# Patient Record
Sex: FEMALE | Race: WHITE | NOT HISPANIC OR LATINO | URBAN - NONMETROPOLITAN AREA
[De-identification: names, ages, dates, MRNs, and addresses within clinical notes are randomized per-mention and may not be internally consistent; named-entity substitution may affect disease eponyms.]

---

## 2017-04-26 ENCOUNTER — OFFICE VISIT (OUTPATIENT)
Dept: FAMILY MEDICINE CLINIC | Facility: CLINIC | Age: 47
End: 2017-04-26

## 2017-04-26 VITALS
SYSTOLIC BLOOD PRESSURE: 168 MMHG | DIASTOLIC BLOOD PRESSURE: 98 MMHG | WEIGHT: 211.8 LBS | BODY MASS INDEX: 38.98 KG/M2 | HEIGHT: 62 IN

## 2017-04-26 DIAGNOSIS — G47.00 INSOMNIA, UNSPECIFIED TYPE: ICD-10-CM

## 2017-04-26 DIAGNOSIS — F41.1 GENERALIZED ANXIETY DISORDER: Primary | ICD-10-CM

## 2017-04-26 PROCEDURE — 99214 OFFICE O/P EST MOD 30 MIN: CPT | Performed by: FAMILY MEDICINE

## 2017-04-26 RX ORDER — ZOLPIDEM TARTRATE 10 MG/1
10 TABLET ORAL NIGHTLY PRN
Qty: 30 TABLET | Refills: 2 | Status: SHIPPED | OUTPATIENT
Start: 2017-04-26 | End: 2017-10-04 | Stop reason: SDUPTHER

## 2017-04-26 RX ORDER — VENLAFAXINE 37.5 MG/1
37.5 TABLET ORAL 2 TIMES DAILY
COMMUNITY
End: 2021-02-03

## 2017-04-26 RX ORDER — ALPRAZOLAM 0.5 MG/1
0.5 TABLET ORAL NIGHTLY PRN
Qty: 30 TABLET | Refills: 0 | Status: SHIPPED | OUTPATIENT
Start: 2017-04-26 | End: 2020-08-05

## 2017-04-26 NOTE — PROGRESS NOTES
Subjective   Lauryn Barlow is a 47 y.o. female.  He is here today for follow-up on anxiety and depression issues and is particularly having difficulty sleeping.  She lost her son in an accident several years ago.  She has understandably had a great difficulty dealing with this and still has episodes of more severe anxiety and difficulty sleeping.  The sleep issues trouble her most every night.  Sometimes she has times where she can deal with this better than others but feels depressed always.  She was suicidal initially but right now says she has no suicidal feelings and she's been on Effexor which she feels helps with the depression symptoms and would like to stay on it.  She's just not sleeping well.  She would like to have Xanax to use when she has the panic attacks.  She describes episodes that are almost like flashbacks from PTSD when something happens that triggers her to think about it.    History of Present Illness   Insomnia   This is a chronic problem. The current episode started more than 1 year ago. The problem occurs daily. The problem has been unchanged. Associated symptoms include fatigue. Pertinent negatives include no abdominal pain, anorexia, arthralgias, change in bowel habit, chest pain, chills, congestion, coughing, diaphoresis, fever, headaches, joint swelling, myalgias, nausea, neck pain, numbness, rash, sore throat, swollen glands, urinary symptoms, vertigo, visual change, vomiting or weakness. The symptoms are aggravated by exertion and stress. Treatments tried: OTC sleep aids. The treatment provided no relief.     The following portions of the patient's history were reviewed and updated as appropriate: allergies, current medications, past family history, past medical history, past social history, past surgical history and problem list.    Review of Systems   Constitutional: Negative.    HENT: Negative.    Respiratory: Negative.  Negative for shortness of breath.    Cardiovascular:  Negative.  Negative for chest pain.   Gastrointestinal: Negative.    Musculoskeletal: Negative.  Negative for myalgias.   Skin: Negative.    Allergic/Immunologic: Negative for immunocompromised state.   Neurological: Negative for dizziness, tremors, seizures, syncope, weakness and numbness.   Hematological: Negative.    Psychiatric/Behavioral: Positive for dysphoric mood and sleep disturbance. Negative for agitation and confusion. The patient is nervous/anxious.        Objective   Physical Exam   Constitutional: She is oriented to person, place, and time. She appears well-developed and well-nourished.   HENT:   Head: Normocephalic and atraumatic.   Nose: Nose normal.   Mouth/Throat: Oropharynx is clear and moist.   Eyes: Conjunctivae and EOM are normal. Pupils are equal, round, and reactive to light.   Neck: Normal range of motion. Neck supple. No JVD present. No tracheal deviation present. No thyromegaly present.   Cardiovascular: Normal rate, regular rhythm, normal heart sounds and intact distal pulses.    No murmur heard.  Pulmonary/Chest: Effort normal and breath sounds normal. She has no wheezes.   Abdominal: Soft. Bowel sounds are normal. She exhibits no distension. There is no tenderness.   Musculoskeletal: Normal range of motion. She exhibits no edema.   Lymphadenopathy:     She has no cervical adenopathy.   Neurological: She is alert and oriented to person, place, and time. Coordination normal.   Skin: Skin is warm and dry. No rash noted.   Psychiatric: She has a normal mood and affect.   Nursing note and vitals reviewed.      Assessment/Plan   Lauryn was seen today for annual exam.    Diagnoses and all orders for this visit:    Generalized anxiety disorder    Insomnia, unspecified type    Other orders  -     zolpidem (AMBIEN) 10 MG tablet; Take 1 tablet by mouth At Night As Needed for Sleep.  -     ALPRAZolam (XANAX) 0.5 MG tablet; Take 1 tablet by mouth At Night As Needed for Anxiety.  The patient has read  and signed the [facility] Controlled Substance Contract.  I will continue to see patient for regular follow up appointments. Patient is well controlled on the medication.  DEVANTE has been reviewed by me and is updated every 3 months. The patient is aware of the potential for addiction and dependence.   Continue Effexor, no refill needed today  Add Ambien to help her sleep as she did well with this this before  Use Xanax only prn for panic attacks

## 2017-07-26 ENCOUNTER — OFFICE VISIT (OUTPATIENT)
Dept: FAMILY MEDICINE CLINIC | Facility: CLINIC | Age: 47
End: 2017-07-26

## 2017-07-26 VITALS
DIASTOLIC BLOOD PRESSURE: 86 MMHG | SYSTOLIC BLOOD PRESSURE: 138 MMHG | BODY MASS INDEX: 35.26 KG/M2 | HEIGHT: 62 IN | WEIGHT: 191.6 LBS

## 2017-07-26 DIAGNOSIS — F32.A DEPRESSIVE DISORDER: ICD-10-CM

## 2017-07-26 DIAGNOSIS — F41.1 GENERALIZED ANXIETY DISORDER: Primary | ICD-10-CM

## 2017-07-26 PROCEDURE — 99213 OFFICE O/P EST LOW 20 MIN: CPT | Performed by: FAMILY MEDICINE

## 2017-07-26 NOTE — PROGRESS NOTES
Subjective   Lauryn Barlow is a 47 y.o. female who presents to the office for follow-up on anxiety and weight issues.  She is using a new product which is over-the-counter and evidently induces some types of ketotic state.  She has tapered herself off the Xanax and Ambien is working off the Effexor.  She doesn't feel she needs it.  She is not having anxiety issues and she has lost 20 pounds in 2 months.  She feels better than she has in about 8 or 9 years.    History of Present Illness   Anxiety   Presents for follow-up visit. Symptoms include decreased concentration, depressed mood, excessive worry, irritability, muscle tension, nervous/anxious behavior, palpitations, panic and restlessness. Patient reports no chest pain, compulsions, confusion, dizziness, dry mouth, feeling of choking, hyperventilation, impotence, insomnia, malaise, nausea, obsessions, shortness of breath or suicidal ideas. Symptoms occur very rarely now . The quality of sleep is good.     Compliance with medications is %.     The following portions of the patient's history were reviewed and updated as appropriate: allergies, current medications, past family history, past medical history, past social history, past surgical history and problem list.    Review of Systems   Constitutional: Negative.    HENT: Negative.    Respiratory: Negative.  Negative for shortness of breath.    Cardiovascular: Negative.  Negative for chest pain.   Gastrointestinal: Negative.    Musculoskeletal: Negative.  Negative for myalgias.   Skin: Negative.    Allergic/Immunologic: Negative for immunocompromised state.   Neurological: Negative for dizziness, tremors, seizures, syncope, weakness and numbness.   Hematological: Negative.    Psychiatric/Behavioral: Negative for agitation, confusion, dysphoric mood and sleep disturbance. The patient is not nervous/anxious.        Objective   Physical Exam   Constitutional: She is oriented to person, place, and time. She  appears well-developed and well-nourished.   HENT:   Head: Normocephalic and atraumatic.   Nose: Nose normal.   Mouth/Throat: Oropharynx is clear and moist.   Eyes: Conjunctivae and EOM are normal. Pupils are equal, round, and reactive to light.   Neck: Normal range of motion. Neck supple. No JVD present. No tracheal deviation present. No thyromegaly present.   Cardiovascular: Normal rate, regular rhythm, normal heart sounds and intact distal pulses.    No murmur heard.  Pulmonary/Chest: Effort normal and breath sounds normal. She has no wheezes.   Abdominal: Soft. Bowel sounds are normal. She exhibits no distension. There is no tenderness.   Musculoskeletal: Normal range of motion. She exhibits no edema.   Lymphadenopathy:     She has no cervical adenopathy.   Neurological: She is alert and oriented to person, place, and time. Coordination normal.   Skin: Skin is warm and dry. No rash noted.   Psychiatric: She has a normal mood and affect.   Nursing note and vitals reviewed.      Assessment/Plan   Lauryn was seen today for follow-up.    Diagnoses and all orders for this visit:    Generalized anxiety disorder    Depressive disorder   she's going to continue with her current diet plan and does not need refill of medications now.  For recurrent symptoms she will contact me and she is going to continue to taper off the Effexor.

## 2017-10-03 RX ORDER — ZOLPIDEM TARTRATE 10 MG/1
TABLET ORAL
Qty: 30 TABLET | Refills: 2 | OUTPATIENT
Start: 2017-10-03

## 2017-10-04 RX ORDER — ZOLPIDEM TARTRATE 10 MG/1
TABLET ORAL
Qty: 30 TABLET | Refills: 2 | OUTPATIENT
Start: 2017-10-04

## 2017-10-04 RX ORDER — ZOLPIDEM TARTRATE 10 MG/1
10 TABLET ORAL NIGHTLY PRN
Qty: 30 TABLET | Refills: 2 | Status: SHIPPED | OUTPATIENT
Start: 2017-10-04 | End: 2020-08-05

## 2020-08-05 ENCOUNTER — TELEMEDICINE (OUTPATIENT)
Dept: FAMILY MEDICINE CLINIC | Facility: CLINIC | Age: 50
End: 2020-08-05

## 2020-08-05 VITALS
WEIGHT: 170 LBS | DIASTOLIC BLOOD PRESSURE: 79 MMHG | HEART RATE: 74 BPM | BODY MASS INDEX: 32.1 KG/M2 | SYSTOLIC BLOOD PRESSURE: 124 MMHG | HEIGHT: 61 IN

## 2020-08-05 DIAGNOSIS — G51.0 BELL'S PALSY: Primary | ICD-10-CM

## 2020-08-05 DIAGNOSIS — I10 ESSENTIAL HYPERTENSION: ICD-10-CM

## 2020-08-05 DIAGNOSIS — D75.1 POLYCYTHEMIA: ICD-10-CM

## 2020-08-05 PROCEDURE — 99214 OFFICE O/P EST MOD 30 MIN: CPT | Performed by: FAMILY MEDICINE

## 2020-08-05 RX ORDER — VIT C/B6/B5/MAGNESIUM/HERB 173 50-5-6-5MG
1 CAPSULE ORAL DAILY
COMMUNITY

## 2020-08-05 RX ORDER — LANOLIN ALCOHOL/MO/W.PET/CERES
1000 CREAM (GRAM) TOPICAL DAILY
COMMUNITY

## 2020-08-05 RX ORDER — OMEGA-3S/DHA/EPA/FISH OIL/D3 300MG-1000
400 CAPSULE ORAL DAILY
COMMUNITY

## 2020-08-05 RX ORDER — LISINOPRIL 20 MG/1
20 TABLET ORAL DAILY
Qty: 30 TABLET | Refills: 5 | Status: SHIPPED | OUTPATIENT
Start: 2020-08-05 | End: 2021-05-05

## 2020-08-05 RX ORDER — LISINOPRIL 20 MG/1
20 TABLET ORAL DAILY
COMMUNITY
Start: 2020-07-17 | End: 2020-08-05 | Stop reason: SDUPTHER

## 2020-08-05 NOTE — PROGRESS NOTES
Subjective   Lauryn Barlow is a 50 y.o. female.   Seen today by video visit due to the Covid-19 quarantine.   Patient developed Bell's palsy a couple weeks ago.  Went to ER initially to confirm that it was Bell's palsy.  She did have work-up there in ER including labs, EKG.  She also had a CT of the head and a CTA of the head and neck as she had markedly elevated blood pressure.  At the hospital diastolic was as high as 110.  She was started on lisinopril there and has monitored her blood pressure regularly since she has been home.  It has been doing significantly better.  /79 today, yesterday 118/78  The Bell's palsy has continued to improve but is not completely resolved    History of Present Illness    The following portions of the patient's history were reviewed and updated as appropriate: allergies, current medications, past family history, past medical history, past social history, past surgical history and problem list.    Review of Systems   Constitutional: Negative.    HENT: Negative.    Respiratory: Negative.  Negative for shortness of breath.    Cardiovascular: Negative.  Negative for chest pain.   Gastrointestinal: Negative.    Musculoskeletal: Negative.  Negative for myalgias.   Skin: Negative.    Allergic/Immunologic: Negative for immunocompromised state.   Neurological: Negative for dizziness, tremors, seizures, syncope, weakness and numbness.   Hematological: Negative.    Psychiatric/Behavioral: Negative for agitation, confusion, dysphoric mood and sleep disturbance. The patient is not nervous/anxious.    All other systems reviewed and are negative.      Objective   Physical Exam   Constitutional: She is oriented to person, place, and time. She appears well-developed and well-nourished. No distress.   HENT:   Head: Normocephalic and atraumatic.   Eyes: Pupils are equal, round, and reactive to light. EOM are normal. Right eye exhibits no discharge. Left eye exhibits no discharge.      Pulmonary/Chest: Effort normal.   Neurological: She is alert and oriented to person, place, and time.   She has an appearance consistent with Bell's palsy on the left side of her face with a mild facial droop   Psychiatric: She has a normal mood and affect. Her behavior is normal. Judgment and thought content normal.       Assessment/Plan   Lauryn was seen today for hypertension.    Diagnoses and all orders for this visit:    Bell's palsy    Essential hypertension  -     lisinopril (PRINIVIL,ZESTRIL) 20 MG tablet; Take 1 tablet by mouth Daily.    Polycythemia    Continue lisinopril for hypertension and will get labs in 3 months    Discussed polycythemia findings on CBC done in ER.  She is a smoker which may contribute to this.  She may need work-up for sleep apnea if symptoms persist or referral for therapeutic phlebotomy but will check a CBC again in 3 months    If Bell's palsy symptoms worsen or persist contact me but otherwise expect they will continue to resolve        This document has been electronically signed by Pratima Ling MD on August 5, 2020 15:24

## 2021-02-03 ENCOUNTER — OFFICE VISIT (OUTPATIENT)
Dept: FAMILY MEDICINE CLINIC | Facility: CLINIC | Age: 51
End: 2021-02-03

## 2021-02-03 DIAGNOSIS — G47.00 INSOMNIA, UNSPECIFIED TYPE: ICD-10-CM

## 2021-02-03 DIAGNOSIS — I10 ESSENTIAL HYPERTENSION: Primary | ICD-10-CM

## 2021-02-03 PROCEDURE — 99443 PR PHYS/QHP TELEPHONE EVALUATION 21-30 MIN: CPT | Performed by: FAMILY MEDICINE

## 2021-02-03 RX ORDER — ZOLPIDEM TARTRATE 10 MG/1
10 TABLET ORAL NIGHTLY PRN
Qty: 30 TABLET | Refills: 2 | Status: SHIPPED | OUTPATIENT
Start: 2021-02-03 | End: 2021-06-09 | Stop reason: SDUPTHER

## 2021-02-03 RX ORDER — MULTIVIT WITH MINERALS/LUTEIN
250 TABLET ORAL DAILY
COMMUNITY

## 2021-02-03 NOTE — PROGRESS NOTES
Subjective   Lauryn Barlow is a 50 y.o. female.   This visit has been rescheduled as a phone visit to comply with patient safety concerns in accordance with CDC recommendations. Total time of discussion was 21 minutes.    You have chosen to receive care through a telephone visit. Do you consent to use a telephone visit for your medical care today? Yes    She had a Bell's palsy in the past year and feels like her speech is a bit different since then.     She had a mild polycythemia on labs in July, needs to recheck and is due for labs for hypertension.  Her BP runs 120-130/80-85.     She does not feel that she needs Ambien every night but would like to have a prescription to take when needed.    History of Present Illness    The following portions of the patient's history were reviewed and updated as appropriate: allergies, current medications, past family history, past medical history, past social history, past surgical history and problem list.    Review of Systems   Constitutional: Negative.    HENT: Negative.    Respiratory: Negative.  Negative for shortness of breath.    Cardiovascular: Negative.  Negative for chest pain.   Gastrointestinal: Negative.    Musculoskeletal: Negative.  Negative for myalgias.   Skin: Negative.    Allergic/Immunologic: Negative for immunocompromised state.   Neurological: Negative for dizziness, tremors, seizures, syncope, weakness and numbness.   Hematological: Negative.    Psychiatric/Behavioral: Negative for agitation, confusion, dysphoric mood and sleep disturbance. The patient is not nervous/anxious.    All other systems reviewed and are negative.    Objective     Assessment/Plan   Diagnoses and all orders for this visit:    1. Essential hypertension (Primary)  -     Comprehensive Metabolic Panel  -     CBC & Differential; Future  -     Lipid Panel; Future  -     T4, Free  -     TSH    2. Insomnia, unspecified type  -     zolpidem (AMBIEN) 10 MG tablet; Take 1 tablet by mouth  At Night As Needed for Sleep.  Dispense: 30 tablet; Refill: 2    Continue lisinopril for hypertension    Return for labs as above including CBC more for monitoring of polycythemia    Continue Ambien for insomnia.  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    As far as Bell's palsy symptoms, if she has any worsening or development of new symptoms contact me.  If speech changes persist may need to consider neurologic imaging.          This document has been electronically signed by Pratima Ling MD on February 3, 2021 11:11 CST

## 2021-03-03 ENCOUNTER — LAB (OUTPATIENT)
Dept: LAB | Facility: OTHER | Age: 51
End: 2021-03-03

## 2021-03-03 DIAGNOSIS — I10 ESSENTIAL HYPERTENSION: ICD-10-CM

## 2021-03-03 LAB
ALBUMIN SERPL-MCNC: 4.5 G/DL (ref 3.5–5)
ALBUMIN/GLOB SERPL: 1.5 G/DL (ref 1.1–1.8)
ALP SERPL-CCNC: 73 U/L (ref 38–126)
ALT SERPL W P-5'-P-CCNC: 22 U/L
ANION GAP SERPL CALCULATED.3IONS-SCNC: 8 MMOL/L (ref 5–15)
AST SERPL-CCNC: 23 U/L (ref 14–36)
BASOPHILS # BLD AUTO: 0.05 10*3/MM3 (ref 0–0.2)
BASOPHILS NFR BLD AUTO: 0.6 % (ref 0–1.5)
BILIRUB SERPL-MCNC: 0.7 MG/DL (ref 0.2–1.3)
BUN SERPL-MCNC: 12 MG/DL (ref 7–23)
BUN/CREAT SERPL: 28.6 (ref 7–25)
CALCIUM SPEC-SCNC: 10.3 MG/DL (ref 8.4–10.2)
CHLORIDE SERPL-SCNC: 109 MMOL/L (ref 101–112)
CHOLEST SERPL-MCNC: 227 MG/DL (ref 150–200)
CO2 SERPL-SCNC: 24 MMOL/L (ref 22–30)
CREAT SERPL-MCNC: 0.42 MG/DL (ref 0.52–1.04)
DEPRECATED RDW RBC AUTO: 42.3 FL (ref 37–54)
EOSINOPHIL # BLD AUTO: 0.14 10*3/MM3 (ref 0–0.4)
EOSINOPHIL NFR BLD AUTO: 1.8 % (ref 0.3–6.2)
ERYTHROCYTE [DISTWIDTH] IN BLOOD BY AUTOMATED COUNT: 13.7 % (ref 12.3–15.4)
GFR SERPL CREATININE-BSD FRML MDRD: 159 ML/MIN/1.73 (ref 51–120)
GLOBULIN UR ELPH-MCNC: 3.1 GM/DL (ref 2.3–3.5)
GLUCOSE SERPL-MCNC: 119 MG/DL (ref 70–99)
HCT VFR BLD AUTO: 47.8 % (ref 34–46.6)
HDLC SERPL-MCNC: 39 MG/DL (ref 40–59)
HGB BLD-MCNC: 16.5 G/DL (ref 12–15.9)
LDLC SERPL CALC-MCNC: 156 MG/DL
LDLC/HDLC SERPL: 3.92 {RATIO} (ref 0–3.22)
LYMPHOCYTES # BLD AUTO: 2.63 10*3/MM3 (ref 0.7–3.1)
LYMPHOCYTES NFR BLD AUTO: 33 % (ref 19.6–45.3)
MCH RBC QN AUTO: 29.5 PG (ref 26.6–33)
MCHC RBC AUTO-ENTMCNC: 34.5 G/DL (ref 31.5–35.7)
MCV RBC AUTO: 85.4 FL (ref 79–97)
MONOCYTES # BLD AUTO: 0.57 10*3/MM3 (ref 0.1–0.9)
MONOCYTES NFR BLD AUTO: 7.2 % (ref 5–12)
NEUTROPHILS NFR BLD AUTO: 4.57 10*3/MM3 (ref 1.7–7)
NEUTROPHILS NFR BLD AUTO: 57.4 % (ref 42.7–76)
PLATELET # BLD AUTO: 144 10*3/MM3 (ref 140–450)
PMV BLD AUTO: 11.9 FL (ref 6–12)
POTASSIUM SERPL-SCNC: 4 MMOL/L (ref 3.4–5)
PROT SERPL-MCNC: 7.6 G/DL (ref 6.3–8.6)
RBC # BLD AUTO: 5.6 10*6/MM3 (ref 3.77–5.28)
SODIUM SERPL-SCNC: 141 MMOL/L (ref 137–145)
T4 FREE SERPL-MCNC: 1.47 NG/DL (ref 0.93–1.7)
TRIGL SERPL-MCNC: 175 MG/DL
TSH SERPL DL<=0.05 MIU/L-ACNC: 1.41 UIU/ML (ref 0.27–4.2)
VLDLC SERPL-MCNC: 32 MG/DL (ref 5–40)
WBC # BLD AUTO: 7.96 10*3/MM3 (ref 3.4–10.8)

## 2021-03-03 PROCEDURE — 84443 ASSAY THYROID STIM HORMONE: CPT | Performed by: FAMILY MEDICINE

## 2021-03-03 PROCEDURE — 84439 ASSAY OF FREE THYROXINE: CPT | Performed by: FAMILY MEDICINE

## 2021-03-03 PROCEDURE — 85025 COMPLETE CBC W/AUTO DIFF WBC: CPT | Performed by: FAMILY MEDICINE

## 2021-03-03 PROCEDURE — 36415 COLL VENOUS BLD VENIPUNCTURE: CPT | Performed by: FAMILY MEDICINE

## 2021-03-03 PROCEDURE — 80053 COMPREHEN METABOLIC PANEL: CPT | Performed by: FAMILY MEDICINE

## 2021-03-03 PROCEDURE — 80061 LIPID PANEL: CPT | Performed by: FAMILY MEDICINE

## 2021-03-04 RX ORDER — ROSUVASTATIN CALCIUM 5 MG/1
5 TABLET, COATED ORAL DAILY
Qty: 30 TABLET | Refills: 5 | Status: SHIPPED | OUTPATIENT
Start: 2021-03-04 | End: 2021-06-09 | Stop reason: SDUPTHER

## 2021-03-08 DIAGNOSIS — D75.1 POLYCYTHEMIA: Primary | ICD-10-CM

## 2021-03-08 DIAGNOSIS — E78.00 ELEVATED CHOLESTEROL: ICD-10-CM

## 2021-05-05 DIAGNOSIS — I10 ESSENTIAL HYPERTENSION: ICD-10-CM

## 2021-05-05 RX ORDER — LISINOPRIL 20 MG/1
20 TABLET ORAL DAILY
Qty: 30 TABLET | Refills: 5 | Status: SHIPPED | OUTPATIENT
Start: 2021-05-05 | End: 2021-06-09 | Stop reason: SDUPTHER

## 2021-06-01 ENCOUNTER — LAB (OUTPATIENT)
Dept: LAB | Facility: OTHER | Age: 51
End: 2021-06-01

## 2021-06-01 DIAGNOSIS — D75.1 POLYCYTHEMIA: ICD-10-CM

## 2021-06-01 DIAGNOSIS — E78.00 ELEVATED CHOLESTEROL: ICD-10-CM

## 2021-06-01 LAB
ALBUMIN SERPL-MCNC: 4.4 G/DL (ref 3.5–5)
ALBUMIN/GLOB SERPL: 1.5 G/DL (ref 1.1–1.8)
ALP SERPL-CCNC: 65 U/L (ref 38–126)
ALT SERPL W P-5'-P-CCNC: 24 U/L
ANION GAP SERPL CALCULATED.3IONS-SCNC: 8 MMOL/L (ref 5–15)
ARTICHOKE IGE QN: 71 MG/DL (ref 0–100)
AST SERPL-CCNC: 35 U/L (ref 14–36)
BILIRUB SERPL-MCNC: 0.6 MG/DL (ref 0.2–1.3)
BUN SERPL-MCNC: 13 MG/DL (ref 7–23)
BUN/CREAT SERPL: 31 (ref 7–25)
CALCIUM SPEC-SCNC: 10.4 MG/DL (ref 8.4–10.2)
CHLORIDE SERPL-SCNC: 105 MMOL/L (ref 101–112)
CO2 SERPL-SCNC: 29 MMOL/L (ref 22–30)
CREAT SERPL-MCNC: 0.42 MG/DL (ref 0.52–1.04)
GFR SERPL CREATININE-BSD FRML MDRD: 158 ML/MIN/1.73 (ref 51–120)
GLOBULIN UR ELPH-MCNC: 3 GM/DL (ref 2.3–3.5)
GLUCOSE SERPL-MCNC: 102 MG/DL (ref 70–99)
POTASSIUM SERPL-SCNC: 4.1 MMOL/L (ref 3.4–5)
PROT SERPL-MCNC: 7.4 G/DL (ref 6.3–8.6)
SODIUM SERPL-SCNC: 142 MMOL/L (ref 137–145)

## 2021-06-01 PROCEDURE — 80053 COMPREHEN METABOLIC PANEL: CPT | Performed by: FAMILY MEDICINE

## 2021-06-01 PROCEDURE — 36415 COLL VENOUS BLD VENIPUNCTURE: CPT | Performed by: FAMILY MEDICINE

## 2021-06-01 PROCEDURE — 83721 ASSAY OF BLOOD LIPOPROTEIN: CPT | Performed by: FAMILY MEDICINE

## 2021-06-09 ENCOUNTER — OFFICE VISIT (OUTPATIENT)
Dept: FAMILY MEDICINE CLINIC | Facility: CLINIC | Age: 51
End: 2021-06-09

## 2021-06-09 VITALS
SYSTOLIC BLOOD PRESSURE: 138 MMHG | HEIGHT: 61 IN | DIASTOLIC BLOOD PRESSURE: 82 MMHG | BODY MASS INDEX: 32.1 KG/M2 | WEIGHT: 170 LBS

## 2021-06-09 DIAGNOSIS — G47.00 INSOMNIA, UNSPECIFIED TYPE: ICD-10-CM

## 2021-06-09 DIAGNOSIS — I10 ESSENTIAL HYPERTENSION: ICD-10-CM

## 2021-06-09 DIAGNOSIS — D75.1 POLYCYTHEMIA: Primary | ICD-10-CM

## 2021-06-09 DIAGNOSIS — E78.2 MIXED HYPERLIPIDEMIA: ICD-10-CM

## 2021-06-09 DIAGNOSIS — G51.0 BELL'S PALSY: ICD-10-CM

## 2021-06-09 PROCEDURE — 99214 OFFICE O/P EST MOD 30 MIN: CPT | Performed by: FAMILY MEDICINE

## 2021-06-09 RX ORDER — ZOLPIDEM TARTRATE 10 MG/1
10 TABLET ORAL NIGHTLY PRN
Qty: 30 TABLET | Refills: 2 | Status: SHIPPED | OUTPATIENT
Start: 2021-06-09 | End: 2021-08-11

## 2021-06-09 RX ORDER — ROSUVASTATIN CALCIUM 5 MG/1
5 TABLET, COATED ORAL DAILY
Qty: 30 TABLET | Refills: 5 | Status: SHIPPED | OUTPATIENT
Start: 2021-06-09 | End: 2021-08-11

## 2021-06-09 RX ORDER — LISINOPRIL 20 MG/1
20 TABLET ORAL DAILY
Qty: 30 TABLET | Refills: 5 | Status: SHIPPED | OUTPATIENT
Start: 2021-06-09 | End: 2021-11-17

## 2021-06-09 NOTE — PROGRESS NOTES
"Subjective   Lauryn Kandy Barlow is a 51 y.o. female.   Patient with chronic insomnia, hypertension.  Here today for follow-up and refills.  Had some recent labs.  She does have polycythemia.  She says that she is a heavy smoker and has no desire to stop at this time.  Explained that this is most likely related.    Needs refill of current Crestor for lipids and lisinopril for hypertension.  Needs to refill Ambien for insomnia.    Had recent Bell's palsy.  Symptoms have improved but she is still having some mild deformity on the left face.    History of Present Illness    The following portions of the patient's history were reviewed and updated as appropriate: allergies, current medications, past family history, past medical history, past social history, past surgical history and problem list.    Review of Systems   Constitutional: Negative.    HENT: Negative.    Respiratory: Negative.  Negative for shortness of breath.    Cardiovascular: Negative.  Negative for chest pain.   Gastrointestinal: Negative.    Musculoskeletal: Negative.  Negative for myalgias.   Skin: Negative.    Allergic/Immunologic: Negative for immunocompromised state.   Neurological: Negative for dizziness, tremors, seizures, syncope, weakness and numbness.   Hematological: Negative.    Psychiatric/Behavioral: Negative for agitation, confusion, dysphoric mood and sleep disturbance. The patient is not nervous/anxious.    All other systems reviewed and are negative.      Objective    Body mass index is 32.12 kg/m².  Vitals:    06/09/21 0951   BP: 138/82   Weight: 77.1 kg (170 lb)   Height: 154.9 cm (61\")       Physical Exam   Constitutional: She is oriented to person, place, and time. She appears well-developed. No distress.   HENT:   Head: Normocephalic and atraumatic.   Eyes: Pupils are equal, round, and reactive to light. Right eye exhibits no discharge. Left eye exhibits no discharge.   Pulmonary/Chest: Effort normal.   Neurological: She is alert and " oriented to person, place, and time.   She has an appearance consistent with Bell's palsy on the left side of her face with a mild facial droop   Psychiatric: Her behavior is normal. Judgment and thought content normal.     Lab on 06/01/2021   Component Date Value Ref Range Status   • Glucose 06/01/2021 102* 70 - 99 mg/dL Final   • BUN 06/01/2021 13  7 - 23 mg/dL Final   • Creatinine 06/01/2021 0.42* 0.52 - 1.04 mg/dL Final   • Sodium 06/01/2021 142  137 - 145 mmol/L Final   • Potassium 06/01/2021 4.1  3.4 - 5.0 mmol/L Final   • Chloride 06/01/2021 105  101 - 112 mmol/L Final   • CO2 06/01/2021 29.0  22.0 - 30.0 mmol/L Final   • Calcium 06/01/2021 10.4* 8.4 - 10.2 mg/dL Final   • Total Protein 06/01/2021 7.4  6.3 - 8.6 g/dL Final   • Albumin 06/01/2021 4.40  3.50 - 5.00 g/dL Final   • ALT (SGPT) 06/01/2021 24  <=35 U/L Final   • AST (SGOT) 06/01/2021 35  14 - 36 U/L Final   • Alkaline Phosphatase 06/01/2021 65  38 - 126 U/L Final   • Total Bilirubin 06/01/2021 0.6  0.2 - 1.3 mg/dL Final   • eGFR Non African Amer 06/01/2021 158* 51 - 120 mL/min/1.73 Final   • Globulin 06/01/2021 3.0  2.3 - 3.5 gm/dL Final   • A/G Ratio 06/01/2021 1.5  1.1 - 1.8 g/dL Final   • BUN/Creatinine Ratio 06/01/2021 31.0* 7.0 - 25.0 Final   • Anion Gap 06/01/2021 8.0  5.0 - 15.0 mmol/L Final   • LDL Cholesterol  06/01/2021 71  0 - 100 mg/dL Final   ]    Assessment/Plan   Diagnoses and all orders for this visit:    1. Polycythemia (Primary)  -     CBC & Differential; Future    2. Essential hypertension  -     lisinopril (PRINIVIL,ZESTRIL) 20 MG tablet; Take 1 tablet by mouth Daily.  Dispense: 30 tablet; Refill: 5    3. Insomnia, unspecified type  -     zolpidem (AMBIEN) 10 MG tablet; Take 1 tablet by mouth At Night As Needed for Sleep.  Dispense: 30 tablet; Refill: 2    4. Bell's palsy    5. Mixed hyperlipidemia  -     rosuvastatin (Crestor) 5 MG tablet; Take 1 tablet by mouth Daily.  Dispense: 30 tablet; Refill: 5    Continue lisinopril for  hypertension     Continue Crestor for lipids    Continues to have polycythemia.  Discussed the role that smoking plays in this and again states she has no desire to quit.  We will continue to monitor with a CBC in 6 months.    Bell's palsy symptoms are mildly persistent.  We will continue to follow.  Discussed the fact that she may not have complete resolution of symptoms.        This document has been electronically signed by Pratima Ling MD on June 9, 2021 18:07 CDT

## 2021-08-11 DIAGNOSIS — E78.2 MIXED HYPERLIPIDEMIA: ICD-10-CM

## 2021-08-11 DIAGNOSIS — G47.00 INSOMNIA, UNSPECIFIED TYPE: ICD-10-CM

## 2021-08-11 RX ORDER — ROSUVASTATIN CALCIUM 5 MG/1
5 TABLET, COATED ORAL DAILY
Qty: 30 TABLET | Refills: 5 | Status: SHIPPED | OUTPATIENT
Start: 2021-08-11 | End: 2022-05-31

## 2021-08-11 RX ORDER — ZOLPIDEM TARTRATE 10 MG/1
TABLET ORAL
Qty: 30 TABLET | Refills: 2 | Status: SHIPPED | OUTPATIENT
Start: 2021-08-11 | End: 2022-01-14

## 2021-11-17 DIAGNOSIS — I10 ESSENTIAL HYPERTENSION: ICD-10-CM

## 2021-11-17 RX ORDER — LISINOPRIL 20 MG/1
20 TABLET ORAL DAILY
Qty: 30 TABLET | Refills: 5 | Status: SHIPPED | OUTPATIENT
Start: 2021-11-17 | End: 2022-05-31

## 2021-11-22 ENCOUNTER — LAB (OUTPATIENT)
Dept: LAB | Facility: OTHER | Age: 51
End: 2021-11-22

## 2021-11-22 DIAGNOSIS — E78.5 HYPERLIPIDEMIA, UNSPECIFIED HYPERLIPIDEMIA TYPE: Primary | ICD-10-CM

## 2021-11-22 DIAGNOSIS — D75.1 POLYCYTHEMIA: ICD-10-CM

## 2021-11-22 DIAGNOSIS — E78.5 HYPERLIPIDEMIA, UNSPECIFIED HYPERLIPIDEMIA TYPE: ICD-10-CM

## 2021-11-22 LAB
ALBUMIN SERPL-MCNC: 4.6 G/DL (ref 3.5–5)
ALBUMIN/GLOB SERPL: 1.6 G/DL (ref 1.1–1.8)
ALP SERPL-CCNC: 82 U/L (ref 38–126)
ALT SERPL W P-5'-P-CCNC: 24 U/L
ANION GAP SERPL CALCULATED.3IONS-SCNC: 7 MMOL/L (ref 5–15)
AST SERPL-CCNC: 24 U/L (ref 14–36)
BASOPHILS # BLD AUTO: 0.05 10*3/MM3 (ref 0–0.2)
BASOPHILS NFR BLD AUTO: 0.5 % (ref 0–1.5)
BILIRUB SERPL-MCNC: 0.6 MG/DL (ref 0.2–1.3)
BUN SERPL-MCNC: 19 MG/DL (ref 7–23)
BUN/CREAT SERPL: 48.7 (ref 7–25)
CALCIUM SPEC-SCNC: 10.7 MG/DL (ref 8.4–10.2)
CHLORIDE SERPL-SCNC: 105 MMOL/L (ref 101–112)
CHOLEST SERPL-MCNC: 194 MG/DL (ref 150–200)
CO2 SERPL-SCNC: 26 MMOL/L (ref 22–30)
CREAT SERPL-MCNC: 0.39 MG/DL (ref 0.52–1.04)
DEPRECATED RDW RBC AUTO: 40.8 FL (ref 37–54)
EOSINOPHIL # BLD AUTO: 0.15 10*3/MM3 (ref 0–0.4)
EOSINOPHIL NFR BLD AUTO: 1.5 % (ref 0.3–6.2)
ERYTHROCYTE [DISTWIDTH] IN BLOOD BY AUTOMATED COUNT: 13.3 % (ref 12.3–15.4)
GFR SERPL CREATININE-BSD FRML MDRD: 173 ML/MIN/1.73 (ref 51–120)
GLOBULIN UR ELPH-MCNC: 2.9 GM/DL (ref 2.3–3.5)
GLUCOSE SERPL-MCNC: 103 MG/DL (ref 70–99)
HCT VFR BLD AUTO: 46.9 % (ref 34–46.6)
HDLC SERPL-MCNC: 44 MG/DL (ref 40–59)
HGB BLD-MCNC: 15.7 G/DL (ref 12–15.9)
LDLC SERPL CALC-MCNC: 104 MG/DL
LDLC/HDLC SERPL: 2.17 {RATIO} (ref 0–3.22)
LYMPHOCYTES # BLD AUTO: 3.34 10*3/MM3 (ref 0.7–3.1)
LYMPHOCYTES NFR BLD AUTO: 33.8 % (ref 19.6–45.3)
MCH RBC QN AUTO: 28.9 PG (ref 26.6–33)
MCHC RBC AUTO-ENTMCNC: 33.5 G/DL (ref 31.5–35.7)
MCV RBC AUTO: 86.4 FL (ref 79–97)
MONOCYTES # BLD AUTO: 0.77 10*3/MM3 (ref 0.1–0.9)
MONOCYTES NFR BLD AUTO: 7.8 % (ref 5–12)
NEUTROPHILS NFR BLD AUTO: 5.57 10*3/MM3 (ref 1.7–7)
NEUTROPHILS NFR BLD AUTO: 56.4 % (ref 42.7–76)
PLATELET # BLD AUTO: 199 10*3/MM3 (ref 140–450)
PMV BLD AUTO: 11 FL (ref 6–12)
POTASSIUM SERPL-SCNC: 4.7 MMOL/L (ref 3.4–5)
PROT SERPL-MCNC: 7.5 G/DL (ref 6.3–8.6)
RBC # BLD AUTO: 5.43 10*6/MM3 (ref 3.77–5.28)
SODIUM SERPL-SCNC: 138 MMOL/L (ref 137–145)
T4 FREE SERPL-MCNC: 1.13 NG/DL (ref 0.93–1.7)
TRIGL SERPL-MCNC: 272 MG/DL
TSH SERPL DL<=0.05 MIU/L-ACNC: 1.72 UIU/ML (ref 0.27–4.2)
VLDLC SERPL-MCNC: 46 MG/DL (ref 5–40)
WBC NRBC COR # BLD: 9.88 10*3/MM3 (ref 3.4–10.8)

## 2021-11-22 PROCEDURE — 80061 LIPID PANEL: CPT | Performed by: FAMILY MEDICINE

## 2021-11-22 PROCEDURE — 84443 ASSAY THYROID STIM HORMONE: CPT | Performed by: FAMILY MEDICINE

## 2021-11-22 PROCEDURE — 85025 COMPLETE CBC W/AUTO DIFF WBC: CPT | Performed by: FAMILY MEDICINE

## 2021-11-22 PROCEDURE — 36415 COLL VENOUS BLD VENIPUNCTURE: CPT | Performed by: FAMILY MEDICINE

## 2021-11-22 PROCEDURE — 84439 ASSAY OF FREE THYROXINE: CPT | Performed by: FAMILY MEDICINE

## 2021-11-22 PROCEDURE — 80053 COMPREHEN METABOLIC PANEL: CPT | Performed by: FAMILY MEDICINE

## 2021-12-08 ENCOUNTER — OFFICE VISIT (OUTPATIENT)
Dept: FAMILY MEDICINE CLINIC | Facility: CLINIC | Age: 51
End: 2021-12-08

## 2021-12-08 VITALS
OXYGEN SATURATION: 97 % | BODY MASS INDEX: 34.93 KG/M2 | HEIGHT: 61 IN | SYSTOLIC BLOOD PRESSURE: 120 MMHG | WEIGHT: 185 LBS | HEART RATE: 68 BPM | DIASTOLIC BLOOD PRESSURE: 64 MMHG | TEMPERATURE: 97.7 F

## 2021-12-08 DIAGNOSIS — E78.5 HYPERLIPIDEMIA, UNSPECIFIED HYPERLIPIDEMIA TYPE: Primary | ICD-10-CM

## 2021-12-08 DIAGNOSIS — D75.1 POLYCYTHEMIA: ICD-10-CM

## 2021-12-08 DIAGNOSIS — G47.00 INSOMNIA, UNSPECIFIED TYPE: ICD-10-CM

## 2021-12-08 DIAGNOSIS — G51.0 BELL'S PALSY: ICD-10-CM

## 2021-12-08 DIAGNOSIS — I10 ESSENTIAL HYPERTENSION: ICD-10-CM

## 2021-12-08 PROCEDURE — 99214 OFFICE O/P EST MOD 30 MIN: CPT | Performed by: FAMILY MEDICINE

## 2021-12-08 NOTE — PROGRESS NOTES
Subjective   Lauryn Barlow is a 51 y.o. female.   Patient with chronic insomnia, hypertension, polycythemia, chronic Bell's palsy.  Has recent labs to review.  H&H are back in the normal range.  Cholesterol is improved although still a bit above goal.  Blood pressures have been well controlled.  She takes Ambien for chronic insomnia which works well for her.  Bell's palsy has improved but is not completely gone and she still has some mild weakness on the left face    History of Present Illness  Hypertension   This is a chronic problem. The current episode started more than 1 year ago. The problem has been waxing and waning since onset. Associated symptoms include anxiety, headaches and malaise/fatigue. Pertinent negatives include no blurred vision, chest pain, neck pain, orthopnea, palpitations, peripheral edema, PND, shortness of breath or sweats. There are no associated agents to hypertension. Risk factors for coronary artery disease include dyslipidemia and family history. Past treatments include ACE inhibitor's The current treatment provides moderate improvement. There are no compliance problems.    Insomnia   This is a chronic problem. The current episode started more than 1 year ago. The problem occurs daily. The problem has been unchanged. Associated symptoms include fatigue. Pertinent negatives include no abdominal pain, anorexia, arthralgias, change in bowel habit, chest pain, chills, congestion, coughing, diaphoresis, fever, headaches, joint swelling, myalgias, nausea, neck pain, numbness, rash, sore throat, swollen glands, urinary symptoms, vertigo, visual change, vomiting or weakness. The symptoms are aggravated by exertion and stress. Treatments tried: OTC sleep aids. The treatment provided no relief.     The following portions of the patient's history were reviewed and updated as appropriate: allergies, current medications, past family history, past medical history, past social history, past surgical  "history and problem list.    Review of Systems   Constitutional: Negative.    HENT: Negative.    Respiratory: Negative.  Negative for shortness of breath.    Cardiovascular: Negative.  Negative for chest pain.   Gastrointestinal: Negative.    Musculoskeletal: Negative.  Negative for myalgias.   Skin: Negative.    Allergic/Immunologic: Negative for immunocompromised state.   Neurological: Negative for dizziness, tremors, seizures, syncope, weakness and numbness.   Hematological: Negative.    Psychiatric/Behavioral: Negative for agitation, confusion, dysphoric mood and sleep disturbance. The patient is not nervous/anxious.    All other systems reviewed and are negative.      Objective    Body mass index is 34.96 kg/m².  Vitals:    12/08/21 0948   BP: 120/64   Pulse: 68   Temp: 97.7 °F (36.5 °C)   TempSrc: Tympanic   SpO2: 97%   Weight: 83.9 kg (185 lb)   Height: 154.9 cm (61\")       Physical Exam   Constitutional: She is oriented to person, place, and time. She appears well-developed. No distress.   HENT:   Head: Normocephalic and atraumatic.   Eyes: Pupils are equal, round, and reactive to light. Right eye exhibits no discharge. Left eye exhibits no discharge.   Pulmonary/Chest: Effort normal.   Neurological: She is alert and oriented to person, place, and time.   She has an appearance consistent with Bell's palsy on the left side of her face with a mild facial droop   Psychiatric: Her behavior is normal. Judgment and thought content normal.     Lab on 11/22/2021   Component Date Value Ref Range Status   • WBC 11/22/2021 9.88  3.40 - 10.80 10*3/mm3 Final   • RBC 11/22/2021 5.43* 3.77 - 5.28 10*6/mm3 Final   • Hemoglobin 11/22/2021 15.7  12.0 - 15.9 g/dL Final   • Hematocrit 11/22/2021 46.9* 34.0 - 46.6 % Final   • MCV 11/22/2021 86.4  79.0 - 97.0 fL Final   • MCH 11/22/2021 28.9  26.6 - 33.0 pg Final   • MCHC 11/22/2021 33.5  31.5 - 35.7 g/dL Final   • RDW 11/22/2021 13.3  12.3 - 15.4 % Final   • RDW-SD 11/22/2021 " 40.8  37.0 - 54.0 fl Final   • MPV 11/22/2021 11.0  6.0 - 12.0 fL Final   • Platelets 11/22/2021 199  140 - 450 10*3/mm3 Final   • Neutrophil % 11/22/2021 56.4  42.7 - 76.0 % Final   • Lymphocyte % 11/22/2021 33.8  19.6 - 45.3 % Final   • Monocyte % 11/22/2021 7.8  5.0 - 12.0 % Final   • Eosinophil % 11/22/2021 1.5  0.3 - 6.2 % Final   • Basophil % 11/22/2021 0.5  0.0 - 1.5 % Final   • Neutrophils, Absolute 11/22/2021 5.57  1.70 - 7.00 10*3/mm3 Final   • Lymphocytes, Absolute 11/22/2021 3.34* 0.70 - 3.10 10*3/mm3 Final   • Monocytes, Absolute 11/22/2021 0.77  0.10 - 0.90 10*3/mm3 Final   • Eosinophils, Absolute 11/22/2021 0.15  0.00 - 0.40 10*3/mm3 Final   • Basophils, Absolute 11/22/2021 0.05  0.00 - 0.20 10*3/mm3 Final   • Glucose 11/22/2021 103* 70 - 99 mg/dL Final   • BUN 11/22/2021 19  7 - 23 mg/dL Final   • Creatinine 11/22/2021 0.39* 0.52 - 1.04 mg/dL Final   • Sodium 11/22/2021 138  137 - 145 mmol/L Final   • Potassium 11/22/2021 4.7  3.4 - 5.0 mmol/L Final   • Chloride 11/22/2021 105  101 - 112 mmol/L Final   • CO2 11/22/2021 26.0  22.0 - 30.0 mmol/L Final   • Calcium 11/22/2021 10.7* 8.4 - 10.2 mg/dL Final   • Total Protein 11/22/2021 7.5  6.3 - 8.6 g/dL Final   • Albumin 11/22/2021 4.60  3.50 - 5.00 g/dL Final   • ALT (SGPT) 11/22/2021 24  <=35 U/L Final   • AST (SGOT) 11/22/2021 24  14 - 36 U/L Final   • Alkaline Phosphatase 11/22/2021 82  38 - 126 U/L Final   • Total Bilirubin 11/22/2021 0.6  0.2 - 1.3 mg/dL Final   • eGFR Non African Amer 11/22/2021 173* 51 - 120 mL/min/1.73 Final   • Globulin 11/22/2021 2.9  2.3 - 3.5 gm/dL Final   • A/G Ratio 11/22/2021 1.6  1.1 - 1.8 g/dL Final   • BUN/Creatinine Ratio 11/22/2021 48.7* 7.0 - 25.0 Final   • Anion Gap 11/22/2021 7.0  5.0 - 15.0 mmol/L Final   • Total Cholesterol 11/22/2021 194  150 - 200 mg/dL Final   • Triglycerides 11/22/2021 272* <=150 mg/dL Final   • HDL Cholesterol 11/22/2021 44  40 - 59 mg/dL Final   • LDL Cholesterol  11/22/2021 104* <=100  mg/dL Final   • VLDL Cholesterol 11/22/2021 46* 5 - 40 mg/dL Final   • LDL/HDL Ratio 11/22/2021 2.17  0.00 - 3.22 Final   • Free T4 11/22/2021 1.13  0.93 - 1.70 ng/dL Final   • TSH 11/22/2021 1.720  0.270 - 4.200 uIU/mL Final   ]    Assessment/Plan   Diagnoses and all orders for this visit:    1. Hyperlipidemia, unspecified hyperlipidemia type (Primary)    2. Polycythemia    3. Insomnia, unspecified type    4. Essential hypertension    5. Bell's palsy    Continue lisinopril for hypertension with regular monitoring and goal of 130/80 or less.    Continue Ambien for insomnia, contact me when refill is needed.  The patient has read and signed the [facility] Controlled Substance Contract.  I will continue to see patient for regular follow up appointments. Patient is well controlled on the medication.  DEVANTE has been reviewed by me and is updated every 3 months. The patient is aware of the potential for addiction and dependence.     Continue Crestor for lipids    Polycythemia improved for the now but will continue to monitor    Bell's palsy symptoms still mildly persistent.        This document has been electronically signed by Pratima Ling MD on December 8, 2021 10:17 CST

## 2022-01-14 DIAGNOSIS — G47.00 INSOMNIA, UNSPECIFIED TYPE: ICD-10-CM

## 2022-01-14 RX ORDER — ZOLPIDEM TARTRATE 10 MG/1
TABLET ORAL
Qty: 30 TABLET | Refills: 2 | Status: SHIPPED | OUTPATIENT
Start: 2022-01-14 | End: 2022-04-12 | Stop reason: SDUPTHER

## 2022-03-29 DIAGNOSIS — E78.5 HYPERLIPIDEMIA, UNSPECIFIED HYPERLIPIDEMIA TYPE: ICD-10-CM

## 2022-03-29 DIAGNOSIS — D75.1 POLYCYTHEMIA: Primary | ICD-10-CM

## 2022-03-29 DIAGNOSIS — I10 ESSENTIAL HYPERTENSION: ICD-10-CM

## 2022-04-12 DIAGNOSIS — G47.00 INSOMNIA, UNSPECIFIED TYPE: ICD-10-CM

## 2022-04-12 RX ORDER — ZOLPIDEM TARTRATE 10 MG/1
10 TABLET ORAL NIGHTLY PRN
Qty: 30 TABLET | Refills: 0 | Status: SHIPPED | OUTPATIENT
Start: 2022-04-12 | End: 2022-05-18

## 2022-04-20 ENCOUNTER — TELEMEDICINE (OUTPATIENT)
Dept: FAMILY MEDICINE CLINIC | Facility: CLINIC | Age: 52
End: 2022-04-20

## 2022-04-20 DIAGNOSIS — E78.2 MIXED HYPERLIPIDEMIA: ICD-10-CM

## 2022-04-20 DIAGNOSIS — I10 ESSENTIAL HYPERTENSION: ICD-10-CM

## 2022-04-20 DIAGNOSIS — G47.00 INSOMNIA, UNSPECIFIED TYPE: Primary | ICD-10-CM

## 2022-04-20 DIAGNOSIS — J30.2 SEASONAL ALLERGIES: ICD-10-CM

## 2022-04-20 PROCEDURE — 99214 OFFICE O/P EST MOD 30 MIN: CPT | Performed by: FAMILY MEDICINE

## 2022-04-20 NOTE — PROGRESS NOTES
Subjective   Lauryn Barlow is a 51 y.o. female.   Seen today by video visit due to the Covid-19 quarantine.   You have chosen to receive care through a telehealth visit.  Do you consent to use a video/audio connection for your medical care today? Yes  She is on Ambien for the insomnia which works well.    She takes lisinopril for hypertension, Crestor for lipids.  Labs are scheduled for next month and she already has follow-up scheduled for afterwards.  She is having seasonal allergy problems and is on Zyrtec.  She still has some stuffiness, itchy throat, and watery eyes.  She wonders if she can take 2 of them.      History of Present Illness  Hypertension   This is a chronic problem. The current episode started more than 1 year ago. The problem has been waxing and waning since onset. Associated symptoms include anxiety, headaches and malaise/fatigue. Pertinent negatives include no blurred vision, chest pain, neck pain, orthopnea, palpitations, peripheral edema, PND, shortness of breath or sweats. There are no associated agents to hypertension. Risk factors for coronary artery disease include dyslipidemia and family history. Past treatments include ACE inhibitor's The current treatment provides moderate improvement. There are no compliance problems.    Insomnia   This is a chronic problem. The current episode started more than 1 year ago. The problem occurs daily. The problem has been unchanged. Associated symptoms include fatigue. Pertinent negatives include no abdominal pain, anorexia, arthralgias, change in bowel habit, chest pain, chills, congestion, coughing, diaphoresis, fever, headaches, joint swelling, myalgias, nausea, neck pain, numbness, rash, sore throat, swollen glands, urinary symptoms, vertigo, visual change, vomiting or weakness. The symptoms are aggravated by exertion and stress. Treatments tried: OTC sleep aids. The treatment provided no relief.     The following portions of the patient's history  were reviewed and updated as appropriate: allergies, current medications, past family history, past medical history, past social history, past surgical history and problem list.    Review of Systems   Constitutional: Negative.    HENT: Negative.    Respiratory: Negative.  Negative for shortness of breath.    Cardiovascular: Negative.  Negative for chest pain.   Gastrointestinal: Negative.    Musculoskeletal: Negative.  Negative for myalgias.   Skin: Negative.    Allergic/Immunologic: Negative for immunocompromised state.   Neurological: Negative for dizziness, tremors, seizures, syncope, weakness and numbness.   Hematological: Negative.    Psychiatric/Behavioral: Negative for agitation, confusion, dysphoric mood and sleep disturbance. The patient is not nervous/anxious.    All other systems reviewed and are negative.      Objective    There is no height or weight on file to calculate BMI.  There were no vitals filed for this visit.    Physical Exam   Constitutional: She is oriented to person, place, and time. She appears well-developed. No distress.   HENT:   Head: Normocephalic and atraumatic.   Eyes: Pupils are equal, round, and reactive to light. Right eye exhibits no discharge. Left eye exhibits no discharge.   Pulmonary/Chest: Effort normal.   Neurological: She is alert and oriented to person, place, and time.   She has an appearance consistent with Bell's palsy on the left side of her face with a mild facial droop   Psychiatric: Her behavior is normal. Judgment and thought content normal.       Assessment/Plan   Diagnoses and all orders for this visit:    1. Insomnia, unspecified type (Primary)    2. Essential hypertension    3. Seasonal allergies    4. Mixed hyperlipidemia    Continue current medications for hypertension and continue to monitor blood pressures regularly with goal of 130/80 or less    Continue Crestor for lipids and get labs next month.    Continue Ambien for insomnia, refill was sent in last  week in anticipation of this visit and she will contact me when next refill is needed.  The patient has read and signed the [facility] Controlled Substance Contract.  I will continue to see patient for regular follow up appointments. Patient is well controlled on the medication.  DEVANTE has been reviewed by me and is updated every 3 months. The patient is aware of the potential for addiction and dependence.     Continue on Zyrtec for seasonal allergies recommend that she add Flonase or Nasacort along with that and contact me if not adequate for symptom control.          This document has been electronically signed by Pratima Ling MD on April 20, 2022 11:41 CDT

## 2022-05-05 RX ORDER — PREDNISONE 10 MG/1
TABLET ORAL
Qty: 48 EACH | Refills: 0 | Status: SHIPPED | OUTPATIENT
Start: 2022-05-05 | End: 2022-05-31

## 2022-05-18 DIAGNOSIS — G47.00 INSOMNIA, UNSPECIFIED TYPE: ICD-10-CM

## 2022-05-18 RX ORDER — ZOLPIDEM TARTRATE 10 MG/1
10 TABLET ORAL NIGHTLY PRN
Qty: 30 TABLET | Refills: 2 | Status: SHIPPED | OUTPATIENT
Start: 2022-05-18 | End: 2022-08-16

## 2022-05-25 ENCOUNTER — LAB (OUTPATIENT)
Dept: LAB | Facility: OTHER | Age: 52
End: 2022-05-25

## 2022-05-25 DIAGNOSIS — I10 ESSENTIAL HYPERTENSION: ICD-10-CM

## 2022-05-25 DIAGNOSIS — E78.5 HYPERLIPIDEMIA, UNSPECIFIED HYPERLIPIDEMIA TYPE: ICD-10-CM

## 2022-05-25 LAB
ALBUMIN SERPL-MCNC: 4.3 G/DL (ref 3.5–5)
ALBUMIN/GLOB SERPL: 1.5 G/DL (ref 1.1–1.8)
ALP SERPL-CCNC: 67 U/L (ref 38–126)
ALT SERPL W P-5'-P-CCNC: 38 U/L
ANION GAP SERPL CALCULATED.3IONS-SCNC: 12 MMOL/L (ref 5–15)
AST SERPL-CCNC: 34 U/L (ref 14–36)
BASOPHILS # BLD AUTO: 0.04 10*3/MM3 (ref 0–0.2)
BASOPHILS NFR BLD AUTO: 0.5 % (ref 0–1.5)
BILIRUB SERPL-MCNC: 0.9 MG/DL (ref 0.2–1.3)
BUN SERPL-MCNC: 14 MG/DL (ref 7–23)
BUN/CREAT SERPL: 26.4 (ref 7–25)
CALCIUM SPEC-SCNC: 9.9 MG/DL (ref 8.4–10.2)
CHLORIDE SERPL-SCNC: 110 MMOL/L (ref 101–112)
CHOLEST SERPL-MCNC: 194 MG/DL (ref 150–200)
CO2 SERPL-SCNC: 22 MMOL/L (ref 22–30)
CREAT SERPL-MCNC: 0.53 MG/DL (ref 0.52–1.04)
DEPRECATED RDW RBC AUTO: 41.3 FL (ref 37–54)
EGFRCR SERPLBLD CKD-EPI 2021: 110.7 ML/MIN/1.73
EOSINOPHIL # BLD AUTO: 0.09 10*3/MM3 (ref 0–0.4)
EOSINOPHIL NFR BLD AUTO: 1.2 % (ref 0.3–6.2)
ERYTHROCYTE [DISTWIDTH] IN BLOOD BY AUTOMATED COUNT: 13.7 % (ref 12.3–15.4)
GLOBULIN UR ELPH-MCNC: 2.9 GM/DL (ref 2.3–3.5)
GLUCOSE SERPL-MCNC: 122 MG/DL (ref 70–99)
HCT VFR BLD AUTO: 42.4 % (ref 34–46.6)
HDLC SERPL-MCNC: 44 MG/DL (ref 40–59)
HGB BLD-MCNC: 14.7 G/DL (ref 12–15.9)
LDLC SERPL CALC-MCNC: 122 MG/DL
LDLC/HDLC SERPL: 2.69 {RATIO} (ref 0–3.22)
LYMPHOCYTES # BLD AUTO: 2.23 10*3/MM3 (ref 0.7–3.1)
LYMPHOCYTES NFR BLD AUTO: 29.2 % (ref 19.6–45.3)
MCH RBC QN AUTO: 29.3 PG (ref 26.6–33)
MCHC RBC AUTO-ENTMCNC: 34.7 G/DL (ref 31.5–35.7)
MCV RBC AUTO: 84.6 FL (ref 79–97)
MONOCYTES # BLD AUTO: 0.53 10*3/MM3 (ref 0.1–0.9)
MONOCYTES NFR BLD AUTO: 6.9 % (ref 5–12)
NEUTROPHILS NFR BLD AUTO: 4.75 10*3/MM3 (ref 1.7–7)
NEUTROPHILS NFR BLD AUTO: 62.2 % (ref 42.7–76)
PLATELET # BLD AUTO: 184 10*3/MM3 (ref 140–450)
PMV BLD AUTO: 10.8 FL (ref 6–12)
POTASSIUM SERPL-SCNC: 3.9 MMOL/L (ref 3.4–5)
PROT SERPL-MCNC: 7.2 G/DL (ref 6.3–8.6)
RBC # BLD AUTO: 5.01 10*6/MM3 (ref 3.77–5.28)
SODIUM SERPL-SCNC: 144 MMOL/L (ref 137–145)
T4 FREE SERPL-MCNC: 1.4 NG/DL (ref 0.93–1.7)
TRIGL SERPL-MCNC: 159 MG/DL
TSH SERPL DL<=0.05 MIU/L-ACNC: 1.11 UIU/ML (ref 0.27–4.2)
VLDLC SERPL-MCNC: 28 MG/DL (ref 5–40)
WBC NRBC COR # BLD: 7.64 10*3/MM3 (ref 3.4–10.8)

## 2022-05-25 PROCEDURE — 80050 GENERAL HEALTH PANEL: CPT | Performed by: FAMILY MEDICINE

## 2022-05-25 PROCEDURE — 84439 ASSAY OF FREE THYROXINE: CPT | Performed by: FAMILY MEDICINE

## 2022-05-25 PROCEDURE — 80061 LIPID PANEL: CPT | Performed by: FAMILY MEDICINE

## 2022-05-25 PROCEDURE — 36415 COLL VENOUS BLD VENIPUNCTURE: CPT | Performed by: FAMILY MEDICINE

## 2022-05-31 ENCOUNTER — OFFICE VISIT (OUTPATIENT)
Dept: FAMILY MEDICINE CLINIC | Facility: CLINIC | Age: 52
End: 2022-05-31

## 2022-05-31 VITALS
TEMPERATURE: 97.3 F | HEIGHT: 61 IN | HEART RATE: 76 BPM | OXYGEN SATURATION: 99 % | BODY MASS INDEX: 36.55 KG/M2 | DIASTOLIC BLOOD PRESSURE: 82 MMHG | SYSTOLIC BLOOD PRESSURE: 136 MMHG | WEIGHT: 193.6 LBS

## 2022-05-31 DIAGNOSIS — G47.00 INSOMNIA, UNSPECIFIED TYPE: ICD-10-CM

## 2022-05-31 DIAGNOSIS — R53.1 LEFT-SIDED WEAKNESS: Primary | ICD-10-CM

## 2022-05-31 DIAGNOSIS — I10 ESSENTIAL HYPERTENSION: ICD-10-CM

## 2022-05-31 DIAGNOSIS — E78.2 MIXED HYPERLIPIDEMIA: ICD-10-CM

## 2022-05-31 PROCEDURE — 99214 OFFICE O/P EST MOD 30 MIN: CPT | Performed by: FAMILY MEDICINE

## 2022-05-31 RX ORDER — ROSUVASTATIN CALCIUM 5 MG/1
5 TABLET, COATED ORAL DAILY
Qty: 30 TABLET | Refills: 5 | Status: SHIPPED | OUTPATIENT
Start: 2022-05-31 | End: 2023-01-17

## 2022-05-31 NOTE — PROGRESS NOTES
Subjective   Lauryn Barlow is a 52 y.o. female.   Patient with hypertension, chronic anxiety, hyperlipidemia, and left-sided Bell's palsy here today for follow-up and refills.  She feels like she has left-sided weakness overall including her arm and leg.  This started after the Bell's palsy has developed.  The Bell's palsy was about 2 years ago in onset and has never fully cleared.  Also had recent labs to review.  Cholesterol is significantly elevated and there is a family history of cardiovascular disease.  She stopped both lisinopril and Crestor a while back.  She was having what sounds like mild angioedema symptoms which I think are likely from the lisinopril but she was not sure which medication was causing it so she stopped them both.    History of Present Illness  Hypertension   This is a chronic problem. The current episode started more than 1 year ago. The problem has been waxing and waning since onset. Associated symptoms include anxiety, headaches and malaise/fatigue. Pertinent negatives include no blurred vision, chest pain, neck pain, orthopnea, palpitations, peripheral edema, PND, shortness of breath or sweats. There are no associated agents to hypertension. Risk factors for coronary artery disease include dyslipidemia and family history. Past treatments include ACE inhibitor's The current treatment provides moderate improvement. There are no compliance problems.    Insomnia   This is a chronic problem. The current episode started more than 1 year ago. The problem occurs daily. The problem has been unchanged. Associated symptoms include fatigue. Pertinent negatives include no abdominal pain, anorexia, arthralgias, change in bowel habit, chest pain, chills, congestion, coughing, diaphoresis, fever, headaches, joint swelling, myalgias, nausea, neck pain, numbness, rash, sore throat, swollen glands, urinary symptoms, vertigo, visual change, vomiting or weakness. The symptoms are aggravated by exertion  "and stress. Treatments tried: OTC sleep aids. The treatment provided no relief.     The following portions of the patient's history were reviewed and updated as appropriate: allergies, current medications, past family history, past medical history, past social history, past surgical history and problem list.    Review of Systems   Constitutional: Negative.    HENT: Negative.    Respiratory: Negative.  Negative for shortness of breath.    Cardiovascular: Negative.  Negative for chest pain.   Gastrointestinal: Negative.    Musculoskeletal: Negative.  Negative for myalgias.   Skin: Negative.    Allergic/Immunologic: Negative for immunocompromised state.   Neurological: Negative for dizziness, tremors, seizures, syncope, weakness and numbness.   Hematological: Negative.    Psychiatric/Behavioral: Negative for agitation, confusion, dysphoric mood and sleep disturbance. The patient is not nervous/anxious.    All other systems reviewed and are negative.      Objective    Body mass index is 36.58 kg/m².  Vitals:    05/31/22 0833   BP: 136/82   Pulse: 76   Temp: 97.3 °F (36.3 °C)   SpO2: 99%   Weight: 87.8 kg (193 lb 9.6 oz)   Height: 154.9 cm (61\")       Physical Exam   Constitutional: She is oriented to person, place, and time. She appears well-developed. No distress.   HENT:   Head: Normocephalic and atraumatic.   Eyes: Pupils are equal, round, and reactive to light. Right eye exhibits no discharge. Left eye exhibits no discharge.   Pulmonary/Chest: Effort normal.   Neurological: She is alert and oriented to person, place, and time.   She has an appearance consistent with Bell's palsy on the left side of her face with a mild facial droop     strength seems equal in both upper extremities, strength in lower extremities seems equal.  Patient has a subjective difference of sensation of the left side than the right side in the extremities.   Psychiatric: Her behavior is normal. Judgment and thought content normal.     Lab " on 05/25/2022   Component Date Value Ref Range Status   • Free T4 05/25/2022 1.40  0.93 - 1.70 ng/dL Final   • TSH 05/25/2022 1.110  0.270 - 4.200 uIU/mL Final   • Glucose 05/25/2022 122 (A) 70 - 99 mg/dL Final   • BUN 05/25/2022 14  7 - 23 mg/dL Final   • Creatinine 05/25/2022 0.53  0.52 - 1.04 mg/dL Final   • Sodium 05/25/2022 144  137 - 145 mmol/L Final   • Potassium 05/25/2022 3.9  3.4 - 5.0 mmol/L Final   • Chloride 05/25/2022 110  101 - 112 mmol/L Final   • CO2 05/25/2022 22.0  22.0 - 30.0 mmol/L Final   • Calcium 05/25/2022 9.9  8.4 - 10.2 mg/dL Final   • Total Protein 05/25/2022 7.2  6.3 - 8.6 g/dL Final   • Albumin 05/25/2022 4.30  3.50 - 5.00 g/dL Final   • ALT (SGPT) 05/25/2022 38 (A) <=35 U/L Final   • AST (SGOT) 05/25/2022 34  14 - 36 U/L Final   • Alkaline Phosphatase 05/25/2022 67  38 - 126 U/L Final   • Total Bilirubin 05/25/2022 0.9  0.2 - 1.3 mg/dL Final   • Globulin 05/25/2022 2.9  2.3 - 3.5 gm/dL Final   • A/G Ratio 05/25/2022 1.5  1.1 - 1.8 g/dL Final   • BUN/Creatinine Ratio 05/25/2022 26.4 (A) 7.0 - 25.0 Final   • Anion Gap 05/25/2022 12.0  5.0 - 15.0 mmol/L Final   • eGFR 05/25/2022 110.7  >60.0 mL/min/1.73 Final    National Kidney Foundation and American Society of Nephrology (ASN) Task Force recommended calculation based on the Chronic Kidney Disease Epidemiology Collaboration (CKD-EPI) equation refit without adjustment for race.   • Total Cholesterol 05/25/2022 194  150 - 200 mg/dL Final   • Triglycerides 05/25/2022 159 (A) <=150 mg/dL Final   • HDL Cholesterol 05/25/2022 44  40 - 59 mg/dL Final   • LDL Cholesterol  05/25/2022 122 (A) <=100 mg/dL Final   • VLDL Cholesterol 05/25/2022 28  5 - 40 mg/dL Final   • LDL/HDL Ratio 05/25/2022 2.69  0.00 - 3.22 Final   • WBC 05/25/2022 7.64  3.40 - 10.80 10*3/mm3 Final   • RBC 05/25/2022 5.01  3.77 - 5.28 10*6/mm3 Final   • Hemoglobin 05/25/2022 14.7  12.0 - 15.9 g/dL Final   • Hematocrit 05/25/2022 42.4  34.0 - 46.6 % Final   • MCV 05/25/2022 84.6   79.0 - 97.0 fL Final   • MCH 05/25/2022 29.3  26.6 - 33.0 pg Final   • MCHC 05/25/2022 34.7  31.5 - 35.7 g/dL Final   • RDW 05/25/2022 13.7  12.3 - 15.4 % Final   • RDW-SD 05/25/2022 41.3  37.0 - 54.0 fl Final   • MPV 05/25/2022 10.8  6.0 - 12.0 fL Final   • Platelets 05/25/2022 184  140 - 450 10*3/mm3 Final   • Neutrophil % 05/25/2022 62.2  42.7 - 76.0 % Final   • Lymphocyte % 05/25/2022 29.2  19.6 - 45.3 % Final   • Monocyte % 05/25/2022 6.9  5.0 - 12.0 % Final   • Eosinophil % 05/25/2022 1.2  0.3 - 6.2 % Final   • Basophil % 05/25/2022 0.5  0.0 - 1.5 % Final   • Neutrophils, Absolute 05/25/2022 4.75  1.70 - 7.00 10*3/mm3 Final   • Lymphocytes, Absolute 05/25/2022 2.23  0.70 - 3.10 10*3/mm3 Final   • Monocytes, Absolute 05/25/2022 0.53  0.10 - 0.90 10*3/mm3 Final   • Eosinophils, Absolute 05/25/2022 0.09  0.00 - 0.40 10*3/mm3 Final   • Basophils, Absolute 05/25/2022 0.04  0.00 - 0.20 10*3/mm3 Final   ]    Assessment & Plan   Diagnoses and all orders for this visit:    1. Left-sided weakness (Primary)  -     MRI Brain With & Without Contrast; Future    2. Mixed hyperlipidemia  -     rosuvastatin (CRESTOR) 5 MG tablet; Take 1 tablet by mouth Daily.  Dispense: 30 tablet; Refill: 5    3. Insomnia, unspecified type    4. Essential hypertension    Listed ACE inhibitor's as an allergy.  We will have her monitor blood pressure regular with goal of 130/80 and if not staying here we will institute treatment with a different medication.    Continue Ambien for insomnia.    Will go back on Crestor for hyperlipidemia and recheck in 3 months for CMP, LDL.    We will get an MRI of the brain due to the left-sided weakness.  Explained that the weakness in the arm and leg would not be due to a Bell's palsy but they have persisted and will continue to follow.        This document has been electronically signed by Pratima Ling MD on May 31, 2022 08:40 CDT

## 2022-07-07 ENCOUNTER — TELEPHONE (OUTPATIENT)
Dept: FAMILY MEDICINE CLINIC | Facility: CLINIC | Age: 52
End: 2022-07-07

## 2022-07-07 DIAGNOSIS — R53.1 LEFT-SIDED WEAKNESS: ICD-10-CM

## 2022-07-07 NOTE — TELEPHONE ENCOUNTER
----- Message from Pratima Ling MD sent at 7/6/2022  6:22 PM CDT -----  Please let her know that the MRI of the brain showed what look like an old stroke, not a recent one.  The radiologist also mentioned that other things such as MS would be a possibility.  I would like to go ahead and have her see a neurologist for an opinion due to her symptoms of the weakness on the left side.  Please refer to neurology in [place] if okay with her.

## 2022-07-11 DIAGNOSIS — R53.1 LEFT-SIDED WEAKNESS: Primary | ICD-10-CM

## 2022-07-18 DIAGNOSIS — Z63.4 BEREAVEMENT: Primary | ICD-10-CM

## 2022-07-18 RX ORDER — ALPRAZOLAM 0.5 MG/1
0.5 TABLET ORAL NIGHTLY PRN
Qty: 30 TABLET | Refills: 0 | Status: SHIPPED | OUTPATIENT
Start: 2022-07-18 | End: 2023-03-08

## 2022-07-18 RX ORDER — DULOXETIN HYDROCHLORIDE 60 MG/1
60 CAPSULE, DELAYED RELEASE ORAL DAILY
Qty: 30 CAPSULE | Refills: 5 | Status: SHIPPED | OUTPATIENT
Start: 2022-07-18 | End: 2023-01-17

## 2022-07-18 SDOH — SOCIAL STABILITY - SOCIAL INSECURITY: DISSAPEARANCE AND DEATH OF FAMILY MEMBER: Z63.4

## 2022-08-16 DIAGNOSIS — G47.00 INSOMNIA, UNSPECIFIED TYPE: ICD-10-CM

## 2022-08-16 RX ORDER — ZOLPIDEM TARTRATE 10 MG/1
TABLET ORAL
Qty: 30 TABLET | Refills: 2 | Status: SHIPPED | OUTPATIENT
Start: 2022-08-16 | End: 2022-11-14 | Stop reason: SDUPTHER

## 2022-11-14 DIAGNOSIS — G47.00 INSOMNIA, UNSPECIFIED TYPE: ICD-10-CM

## 2022-11-15 RX ORDER — ZOLPIDEM TARTRATE 10 MG/1
10 TABLET ORAL NIGHTLY PRN
Qty: 30 TABLET | Refills: 2 | Status: SHIPPED | OUTPATIENT
Start: 2022-11-15 | End: 2023-02-19

## 2022-11-22 ENCOUNTER — TELEMEDICINE (OUTPATIENT)
Dept: FAMILY MEDICINE CLINIC | Facility: CLINIC | Age: 52
End: 2022-11-22

## 2022-11-22 DIAGNOSIS — E78.2 MIXED HYPERLIPIDEMIA: ICD-10-CM

## 2022-11-22 DIAGNOSIS — G47.00 INSOMNIA, UNSPECIFIED TYPE: Primary | ICD-10-CM

## 2022-11-22 DIAGNOSIS — Z63.4 BEREAVEMENT: ICD-10-CM

## 2022-11-22 DIAGNOSIS — Z12.31 ENCOUNTER FOR SCREENING MAMMOGRAM FOR MALIGNANT NEOPLASM OF BREAST: ICD-10-CM

## 2022-11-22 PROCEDURE — 99214 OFFICE O/P EST MOD 30 MIN: CPT | Performed by: FAMILY MEDICINE

## 2022-11-22 SDOH — SOCIAL STABILITY - SOCIAL INSECURITY: DISSAPEARANCE AND DEATH OF FAMILY MEMBER: Z63.4

## 2022-11-22 NOTE — PROGRESS NOTES
Subjective   Lauryn Barlow is a 52 y.o. female.   Seen today by video visit due to the Covid-19 quarantine.   You have chosen to receive care through a telehealth visit.  Do you consent to use a video/audio connection for your medical care today? Yes    Patient following up today regarding chronic insomnia, hyperlipidemia, depression.  Ambien was refilled in anticipation of this visit.  She takes it nightly to help rest and it seems to continue to work well for her.  She is on Cymbalta for the bereavement related depression and would like to continue this.    She is on rosuvastatin for lipids, current on labs.    History of Present Illness  Insomnia   This is a chronic problem. The current episode started more than 1 year ago. The problem occurs daily. The problem has been unchanged. Associated symptoms include fatigue. Pertinent negatives include no abdominal pain, anorexia, arthralgias, change in bowel habit, chest pain, chills, congestion, coughing, diaphoresis, fever, headaches, joint swelling, myalgias, nausea, neck pain, numbness, rash, sore throat, swollen glands, urinary symptoms, vertigo, visual change, vomiting or weakness. The symptoms are aggravated by exertion and stress. Treatments tried: OTC sleep aids. The treatment provided no relief.     The following portions of the patient's history were reviewed and updated as appropriate: allergies, current medications, past family history, past medical history, past social history, past surgical history and problem list.    Review of Systems   Constitutional: Negative.    HENT: Negative.    Respiratory: Negative.  Negative for shortness of breath.    Cardiovascular: Negative.  Negative for chest pain.   Gastrointestinal: Negative.    Musculoskeletal: Negative.  Negative for myalgias.   Skin: Negative.    Allergic/Immunologic: Negative for immunocompromised state.   Neurological: Negative for dizziness, tremors, seizures, syncope, weakness and numbness.    Hematological: Negative.    Psychiatric/Behavioral: Negative for agitation, confusion, dysphoric mood and sleep disturbance. The patient is not nervous/anxious.    All other systems reviewed and are negative.      Objective    There is no height or weight on file to calculate BMI.  There were no vitals filed for this visit. Answers for HPI/ROS submitted by the patient on 11/15/2022  Please describe your symptoms.: for my refills..., anxiety, depression, Cholesterol, sleep  Have you had these symptoms before?: Yes  How long have you been having these symptoms?: Greater than 2 weeks  Please list any medications you are currently taking for this condition.: the medicines prescribed  Please describe any probable cause for these symptoms. : anxiety and depression...lack sleep...Doc knows, Cholesterol...I like food that's not good for me and don't move my body enough  What is the primary reason for your visit?: Other    Physical Exam   Constitutional: She is oriented to person, place, and time. She appears well-developed and well-nourished. No distress.   HENT:   Head: Normocephalic and atraumatic.   Eyes: Pupils are equal, round, and reactive to light. EOM are normal. Right eye exhibits no discharge. Left eye exhibits no discharge.   Pulmonary/Chest: Effort normal.   Neurological: She is alert and oriented to person, place, and time.   Psychiatric: She has a normal mood and affect. Her behavior is normal. Judgment and thought content normal.       Assessment & Plan   Diagnoses and all orders for this visit:    1. Insomnia, unspecified type (Primary)    2. Mixed hyperlipidemia    3. Bereavement    4. Encounter for screening mammogram for malignant neoplasm of breast  -     Mammo Screening Bilateral With CAD; Future    The patient has read and signed the [facility] Controlled Substance Contract.  I will continue to see patient for regular follow up appointments. Patient is well controlled on the medication.  DEVANTE  has been reviewed by me and is updated every 3 months. The patient is aware of the potential for addiction and dependence.     Continue on Ambien for chronic insomnia    Continue on rosuvastatin for hyperlipidemia we will get labs next visit.    Continue on Cymbalta for depression, related to bereavement and contact me for any changes or problems.  She has had some Xanax from time to time to keep for panic attacks but does not need a refill of this right now.    Due for mammogram, will order        This document has been electronically signed by Pratima Ling MD on November 22, 2022 08:06 CST

## 2023-01-17 DIAGNOSIS — E78.2 MIXED HYPERLIPIDEMIA: ICD-10-CM

## 2023-01-17 RX ORDER — ROSUVASTATIN CALCIUM 5 MG/1
5 TABLET, COATED ORAL DAILY
Qty: 30 TABLET | Refills: 5 | Status: SHIPPED | OUTPATIENT
Start: 2023-01-17 | End: 2023-03-08 | Stop reason: SDUPTHER

## 2023-01-17 RX ORDER — DULOXETIN HYDROCHLORIDE 60 MG/1
60 CAPSULE, DELAYED RELEASE ORAL DAILY
Qty: 30 CAPSULE | Refills: 5 | Status: SHIPPED | OUTPATIENT
Start: 2023-01-17 | End: 2023-03-08 | Stop reason: SDUPTHER

## 2023-02-17 DIAGNOSIS — G47.00 INSOMNIA, UNSPECIFIED TYPE: ICD-10-CM

## 2023-02-19 RX ORDER — ZOLPIDEM TARTRATE 10 MG/1
10 TABLET ORAL NIGHTLY PRN
Qty: 30 TABLET | Refills: 2 | Status: SHIPPED | OUTPATIENT
Start: 2023-02-19 | End: 2023-03-08 | Stop reason: SDUPTHER

## 2023-02-20 DIAGNOSIS — I10 ESSENTIAL HYPERTENSION: Primary | ICD-10-CM

## 2023-03-01 ENCOUNTER — LAB (OUTPATIENT)
Dept: LAB | Facility: OTHER | Age: 53
End: 2023-03-01
Payer: COMMERCIAL

## 2023-03-01 DIAGNOSIS — I10 ESSENTIAL HYPERTENSION: ICD-10-CM

## 2023-03-01 LAB
ALBUMIN SERPL-MCNC: 4.5 G/DL (ref 3.5–5)
ALBUMIN/GLOB SERPL: 1.6 G/DL (ref 1.1–1.8)
ALP SERPL-CCNC: 71 U/L (ref 38–126)
ALT SERPL W P-5'-P-CCNC: 46 U/L
ANION GAP SERPL CALCULATED.3IONS-SCNC: 8 MMOL/L (ref 5–15)
AST SERPL-CCNC: 41 U/L (ref 14–36)
BASOPHILS # BLD AUTO: 0.03 10*3/MM3 (ref 0–0.2)
BASOPHILS NFR BLD AUTO: 0.5 % (ref 0–1.5)
BILIRUB SERPL-MCNC: 1 MG/DL (ref 0.2–1.3)
BUN SERPL-MCNC: 12 MG/DL (ref 7–23)
BUN/CREAT SERPL: 26.1 (ref 7–25)
CALCIUM SPEC-SCNC: 9.7 MG/DL (ref 8.4–10.2)
CHLORIDE SERPL-SCNC: 105 MMOL/L (ref 101–112)
CHOLEST SERPL-MCNC: 222 MG/DL (ref 0–200)
CO2 SERPL-SCNC: 26 MMOL/L (ref 22–30)
CREAT SERPL-MCNC: 0.46 MG/DL (ref 0.52–1.04)
DEPRECATED RDW RBC AUTO: 43.9 FL (ref 37–54)
EGFRCR SERPLBLD CKD-EPI 2021: 114.6 ML/MIN/1.73
EOSINOPHIL # BLD AUTO: 0.08 10*3/MM3 (ref 0–0.4)
EOSINOPHIL NFR BLD AUTO: 1.3 % (ref 0.3–6.2)
ERYTHROCYTE [DISTWIDTH] IN BLOOD BY AUTOMATED COUNT: 14.7 % (ref 12.3–15.4)
GLOBULIN UR ELPH-MCNC: 2.9 GM/DL (ref 2.3–3.5)
GLUCOSE SERPL-MCNC: 108 MG/DL (ref 70–99)
HCT VFR BLD AUTO: 44.2 % (ref 34–46.6)
HDLC SERPL-MCNC: 49 MG/DL (ref 40–60)
HGB BLD-MCNC: 14.8 G/DL (ref 12–15.9)
LDLC SERPL CALC-MCNC: 144 MG/DL (ref 0–100)
LDLC/HDLC SERPL: 2.86 {RATIO}
LYMPHOCYTES # BLD AUTO: 1.95 10*3/MM3 (ref 0.7–3.1)
LYMPHOCYTES NFR BLD AUTO: 30.5 % (ref 19.6–45.3)
MCH RBC QN AUTO: 27.7 PG (ref 26.6–33)
MCHC RBC AUTO-ENTMCNC: 33.5 G/DL (ref 31.5–35.7)
MCV RBC AUTO: 82.8 FL (ref 79–97)
MONOCYTES # BLD AUTO: 0.43 10*3/MM3 (ref 0.1–0.9)
MONOCYTES NFR BLD AUTO: 6.7 % (ref 5–12)
NEUTROPHILS NFR BLD AUTO: 3.9 10*3/MM3 (ref 1.7–7)
NEUTROPHILS NFR BLD AUTO: 61 % (ref 42.7–76)
PLATELET # BLD AUTO: 180 10*3/MM3 (ref 140–450)
PMV BLD AUTO: 11.4 FL (ref 6–12)
POTASSIUM SERPL-SCNC: 4.6 MMOL/L (ref 3.4–5)
PROT SERPL-MCNC: 7.4 G/DL (ref 6.3–8.6)
RBC # BLD AUTO: 5.34 10*6/MM3 (ref 3.77–5.28)
SODIUM SERPL-SCNC: 139 MMOL/L (ref 137–145)
T4 FREE SERPL-MCNC: 1.2 NG/DL (ref 0.93–1.7)
TRIGL SERPL-MCNC: 164 MG/DL (ref 0–150)
TSH SERPL DL<=0.05 MIU/L-ACNC: 1.04 UIU/ML (ref 0.27–4.2)
VLDLC SERPL-MCNC: 29 MG/DL (ref 5–40)
WBC NRBC COR # BLD: 6.39 10*3/MM3 (ref 3.4–10.8)

## 2023-03-01 PROCEDURE — 84439 ASSAY OF FREE THYROXINE: CPT | Performed by: FAMILY MEDICINE

## 2023-03-01 PROCEDURE — 80050 GENERAL HEALTH PANEL: CPT | Performed by: FAMILY MEDICINE

## 2023-03-01 PROCEDURE — 36415 COLL VENOUS BLD VENIPUNCTURE: CPT | Performed by: FAMILY MEDICINE

## 2023-03-01 PROCEDURE — 80061 LIPID PANEL: CPT | Performed by: FAMILY MEDICINE

## 2023-03-08 ENCOUNTER — TELEMEDICINE (OUTPATIENT)
Dept: FAMILY MEDICINE CLINIC | Facility: CLINIC | Age: 53
End: 2023-03-08
Payer: COMMERCIAL

## 2023-03-08 DIAGNOSIS — G47.00 INSOMNIA, UNSPECIFIED TYPE: Primary | ICD-10-CM

## 2023-03-08 DIAGNOSIS — E78.2 MIXED HYPERLIPIDEMIA: ICD-10-CM

## 2023-03-08 DIAGNOSIS — R74.8 ELEVATED LIVER ENZYMES: ICD-10-CM

## 2023-03-08 DIAGNOSIS — R73.03 PREDIABETES: ICD-10-CM

## 2023-03-08 DIAGNOSIS — I10 ESSENTIAL HYPERTENSION: ICD-10-CM

## 2023-03-08 DIAGNOSIS — F33.41 MAJOR DEPRESSIVE DISORDER, RECURRENT EPISODE, IN PARTIAL REMISSION: ICD-10-CM

## 2023-03-08 PROCEDURE — 99214 OFFICE O/P EST MOD 30 MIN: CPT | Performed by: FAMILY MEDICINE

## 2023-03-08 RX ORDER — ZOLPIDEM TARTRATE 10 MG/1
10 TABLET ORAL NIGHTLY PRN
Qty: 90 TABLET | Refills: 1 | Status: SHIPPED | OUTPATIENT
Start: 2023-03-08

## 2023-03-08 RX ORDER — DULOXETIN HYDROCHLORIDE 60 MG/1
60 CAPSULE, DELAYED RELEASE ORAL DAILY
Qty: 90 CAPSULE | Refills: 3 | Status: SHIPPED | OUTPATIENT
Start: 2023-03-08

## 2023-03-08 RX ORDER — ROSUVASTATIN CALCIUM 5 MG/1
5 TABLET, COATED ORAL DAILY
Qty: 90 TABLET | Refills: 3 | Status: SHIPPED | OUTPATIENT
Start: 2023-03-08

## 2023-03-08 NOTE — PROGRESS NOTES
Subjective   Lauryn Barlow is a 52 y.o. female.   Seen today by video visit due to the Covid-19 quarantine.   You have chosen to receive care through a telehealth visit.  Do you consent to use a video/audio connection for your medical care today? Yes    Patient following up today regarding chronic insomnia, hyperlipidemia, depression.  She ran out of her Cymbalta for 3 days and was feeling some withdrawal.  Would like to get for a 90-day supply at this time.    Ambien still works well for the insomnia.    Cholesterol has gone up.  She is admittedly not taking the Crestor on a regular basis.      Liver enzymes mildly elevated, I suspect likely due to hepatic steatosis.    History of Present Illness  Insomnia   This is a chronic problem. The current episode started more than 1 year ago. The problem occurs daily. The problem has been unchanged. Associated symptoms include fatigue. Pertinent negatives include no abdominal pain, anorexia, arthralgias, change in bowel habit, chest pain, chills, congestion, coughing, diaphoresis, fever, headaches, joint swelling, myalgias, nausea, neck pain, numbness, rash, sore throat, swollen glands, urinary symptoms, vertigo, visual change, vomiting or weakness. The symptoms are aggravated by exertion and stress. Treatments tried: OTC sleep aids. The treatment provided no relief.     The following portions of the patient's history were reviewed and updated as appropriate: allergies, current medications, past family history, past medical history, past social history, past surgical history and problem list.    Review of Systems   Constitutional: Negative.    HENT: Negative.    Respiratory: Negative.  Negative for shortness of breath.    Cardiovascular: Negative.  Negative for chest pain.   Gastrointestinal: Negative.    Musculoskeletal: Negative.  Negative for myalgias.   Skin: Negative.    Allergic/Immunologic: Negative for immunocompromised state.   Neurological: Negative for  dizziness, tremors, seizures, syncope, weakness and numbness.   Hematological: Negative.    Psychiatric/Behavioral: Negative for agitation, confusion, dysphoric mood and sleep disturbance. The patient is not nervous/anxious.    All other systems reviewed and are negative.      Objective    There is no height or weight on file to calculate BMI.  There were no vitals filed for this visit.       Physical Exam   Constitutional: She is oriented to person, place, and time. She appears well-developed. No distress.   HENT:   Head: Normocephalic and atraumatic.   Eyes: Pupils are equal, round, and reactive to light. Right eye exhibits no discharge. Left eye exhibits no discharge.   Pulmonary/Chest: Effort normal.   Neurological: She is alert and oriented to person, place, and time.   Psychiatric: Her behavior is normal. Judgment and thought content normal.     Lab on 03/01/2023   Component Date Value Ref Range Status   • Glucose 03/01/2023 108 (H)  70 - 99 mg/dL Final   • BUN 03/01/2023 12  7 - 23 mg/dL Final   • Creatinine 03/01/2023 0.46 (L)  0.52 - 1.04 mg/dL Final   • Sodium 03/01/2023 139  137 - 145 mmol/L Final   • Potassium 03/01/2023 4.6  3.4 - 5.0 mmol/L Final   • Chloride 03/01/2023 105  101 - 112 mmol/L Final   • CO2 03/01/2023 26.0  22.0 - 30.0 mmol/L Final   • Calcium 03/01/2023 9.7  8.4 - 10.2 mg/dL Final   • Total Protein 03/01/2023 7.4  6.3 - 8.6 g/dL Final   • Albumin 03/01/2023 4.5  3.5 - 5.0 g/dL Final   • ALT (SGPT) 03/01/2023 46 (H)  <=35 U/L Final   • AST (SGOT) 03/01/2023 41 (H)  14 - 36 U/L Final   • Alkaline Phosphatase 03/01/2023 71  38 - 126 U/L Final   • Total Bilirubin 03/01/2023 1.0  0.2 - 1.3 mg/dL Final   • Globulin 03/01/2023 2.9  2.3 - 3.5 gm/dL Final   • A/G Ratio 03/01/2023 1.6  1.1 - 1.8 g/dL Final   • BUN/Creatinine Ratio 03/01/2023 26.1 (H)  7.0 - 25.0 Final   • Anion Gap 03/01/2023 8.0  5.0 - 15.0 mmol/L Final   • eGFR 03/01/2023 114.6  >60.0 mL/min/1.73 Final   • Total Cholesterol  03/01/2023 222 (H)  0 - 200 mg/dL Final   • Triglycerides 03/01/2023 164 (H)  0 - 150 mg/dL Final   • HDL Cholesterol 03/01/2023 49  40 - 60 mg/dL Final   • LDL Cholesterol  03/01/2023 144 (H)  0 - 100 mg/dL Final   • VLDL Cholesterol 03/01/2023 29  5 - 40 mg/dL Final   • LDL/HDL Ratio 03/01/2023 2.86   Final   • TSH 03/01/2023 1.040  0.270 - 4.200 uIU/mL Final   • Free T4 03/01/2023 1.20  0.93 - 1.70 ng/dL Final   • WBC 03/01/2023 6.39  3.40 - 10.80 10*3/mm3 Final   • RBC 03/01/2023 5.34 (H)  3.77 - 5.28 10*6/mm3 Final   • Hemoglobin 03/01/2023 14.8  12.0 - 15.9 g/dL Final   • Hematocrit 03/01/2023 44.2  34.0 - 46.6 % Final   • MCV 03/01/2023 82.8  79.0 - 97.0 fL Final   • MCH 03/01/2023 27.7  26.6 - 33.0 pg Final   • MCHC 03/01/2023 33.5  31.5 - 35.7 g/dL Final   • RDW 03/01/2023 14.7  12.3 - 15.4 % Final   • RDW-SD 03/01/2023 43.9  37.0 - 54.0 fl Final   • MPV 03/01/2023 11.4  6.0 - 12.0 fL Final   • Platelets 03/01/2023 180  140 - 450 10*3/mm3 Final   • Neutrophil % 03/01/2023 61.0  42.7 - 76.0 % Final   • Lymphocyte % 03/01/2023 30.5  19.6 - 45.3 % Final   • Monocyte % 03/01/2023 6.7  5.0 - 12.0 % Final   • Eosinophil % 03/01/2023 1.3  0.3 - 6.2 % Final   • Basophil % 03/01/2023 0.5  0.0 - 1.5 % Final   • Neutrophils, Absolute 03/01/2023 3.90  1.70 - 7.00 10*3/mm3 Final   • Lymphocytes, Absolute 03/01/2023 1.95  0.70 - 3.10 10*3/mm3 Final   • Monocytes, Absolute 03/01/2023 0.43  0.10 - 0.90 10*3/mm3 Final   • Eosinophils, Absolute 03/01/2023 0.08  0.00 - 0.40 10*3/mm3 Final   • Basophils, Absolute 03/01/2023 0.03  0.00 - 0.20 10*3/mm3 Final   ]  Assessment & Plan   Diagnoses and all orders for this visit:    1. Insomnia, unspecified type (Primary)  -     zolpidem (AMBIEN) 10 MG tablet; Take 1 tablet by mouth At Night As Needed for Sleep.  Dispense: 90 tablet; Refill: 1    2. Mixed hyperlipidemia  -     rosuvastatin (CRESTOR) 5 MG tablet; Take 1 tablet by mouth Daily.  Dispense: 90 tablet; Refill: 3    3.  Essential hypertension    4. Prediabetes  -     Hemoglobin A1c  -     Comprehensive Metabolic Panel  -     LDL Cholesterol, Direct; Future    5. Elevated liver enzymes  -     LAYTON Fibrosure; Future    6. Major depressive disorder, recurrent episode, in partial remission (HCC)  -     DULoxetine (CYMBALTA) 60 MG capsule; Take 1 capsule by mouth Daily.  Dispense: 90 capsule; Refill: 3    The patient has read and signed the [facility] Controlled Substance Contract.  I will continue to see patient for regular follow up appointments. Patient is well controlled on the medication.  DEVANTE has been reviewed by me and is updated every 3 months. The patient is aware of the potential for addiction and dependence.     Reviewed labs with her as above today    We will continue on Ambien for chronic insomnia    We will continue on Cymbalta.  90-day supply of medications is given.    Cautioned about taking medication as prescribed.  She is going to try to do better about remembering to take the Crestor.  Discussed options for treatment of hepatic steatosis including weight loss and lowering cholesterol.  We will confirm with Layton FibroSure with next labs and get labs again in 3 months    Discussed her blood sugar as well.  Certainly is compatible with prediabetes.  We will check an A1c with next labs as well.  Weight loss should help with this also        This document has been electronically signed by Pratima Ling MD on March 8, 2023 07:56 CST

## 2023-05-31 ENCOUNTER — LAB (OUTPATIENT)
Dept: LAB | Facility: OTHER | Age: 53
End: 2023-05-31

## 2023-05-31 DIAGNOSIS — R74.8 ELEVATED LIVER ENZYMES: ICD-10-CM

## 2023-05-31 DIAGNOSIS — R73.03 PREDIABETES: ICD-10-CM

## 2023-05-31 LAB
ALBUMIN SERPL-MCNC: 3.9 G/DL (ref 3.5–5)
ALBUMIN/GLOB SERPL: 1.3 G/DL (ref 1.1–1.8)
ALP SERPL-CCNC: 83 U/L (ref 38–126)
ALT SERPL W P-5'-P-CCNC: 42 U/L
ANION GAP SERPL CALCULATED.3IONS-SCNC: 8 MMOL/L (ref 5–15)
ARTICHOKE IGE QN: 85 MG/DL (ref 0–100)
AST SERPL-CCNC: 39 U/L (ref 14–36)
BILIRUB SERPL-MCNC: 0.8 MG/DL (ref 0.2–1.3)
BUN SERPL-MCNC: 14 MG/DL (ref 7–23)
BUN/CREAT SERPL: 25.9 (ref 7–25)
CALCIUM SPEC-SCNC: 9.7 MG/DL (ref 8.4–10.2)
CHLORIDE SERPL-SCNC: 103 MMOL/L (ref 101–112)
CO2 SERPL-SCNC: 27 MMOL/L (ref 22–30)
CREAT SERPL-MCNC: 0.54 MG/DL (ref 0.52–1.04)
EGFRCR SERPLBLD CKD-EPI 2021: 109.6 ML/MIN/1.73
GLOBULIN UR ELPH-MCNC: 3 GM/DL (ref 2.3–3.5)
GLUCOSE SERPL-MCNC: 126 MG/DL (ref 70–99)
HBA1C MFR BLD: 5.4 % (ref 4.8–5.6)
POTASSIUM SERPL-SCNC: 4 MMOL/L (ref 3.4–5)
PROT SERPL-MCNC: 6.9 G/DL (ref 6.3–8.6)
SODIUM SERPL-SCNC: 138 MMOL/L (ref 137–145)

## 2023-05-31 PROCEDURE — 84478 ASSAY OF TRIGLYCERIDES: CPT | Performed by: FAMILY MEDICINE

## 2023-05-31 PROCEDURE — 83036 HEMOGLOBIN GLYCOSYLATED A1C: CPT | Performed by: FAMILY MEDICINE

## 2023-05-31 PROCEDURE — 82465 ASSAY BLD/SERUM CHOLESTEROL: CPT | Performed by: FAMILY MEDICINE

## 2023-05-31 PROCEDURE — 82247 BILIRUBIN TOTAL: CPT | Performed by: FAMILY MEDICINE

## 2023-05-31 PROCEDURE — 82947 ASSAY GLUCOSE BLOOD QUANT: CPT | Performed by: FAMILY MEDICINE

## 2023-05-31 PROCEDURE — 82172 ASSAY OF APOLIPOPROTEIN: CPT | Performed by: FAMILY MEDICINE

## 2023-05-31 PROCEDURE — 83883 ASSAY NEPHELOMETRY NOT SPEC: CPT | Performed by: FAMILY MEDICINE

## 2023-05-31 PROCEDURE — 83010 ASSAY OF HAPTOGLOBIN QUANT: CPT | Performed by: FAMILY MEDICINE

## 2023-05-31 PROCEDURE — 82977 ASSAY OF GGT: CPT | Performed by: FAMILY MEDICINE

## 2023-05-31 PROCEDURE — 83721 ASSAY OF BLOOD LIPOPROTEIN: CPT | Performed by: FAMILY MEDICINE

## 2023-06-03 LAB
A2 MACROGLOB SERPL-MCNC: 205 MG/DL (ref 110–276)
ALT SERPL W P-5'-P-CCNC: 37 IU/L (ref 0–40)
APO A-I SERPL-MCNC: 162 MG/DL (ref 116–209)
AST SERPL W P-5'-P-CCNC: 30 IU/L (ref 0–40)
BILIRUB SERPL-MCNC: 0.3 MG/DL (ref 0–1.2)
CHOLEST SERPL-MCNC: 159 MG/DL (ref 100–199)
FIBROSIS SCORING:: ABNORMAL
FIBROSIS STAGE SERPL QL: ABNORMAL
GGT SERPL-CCNC: 11 IU/L (ref 0–60)
GLUCOSE SERPL-MCNC: 116 MG/DL (ref 70–99)
HAPTOGLOB SERPL-MCNC: 108 MG/DL (ref 33–346)
LABORATORY COMMENT REPORT: ABNORMAL
LIVER FIBR SCORE SERPL CALC.FIBROSURE: 0.09 (ref 0–0.21)
LIVER STEATOSIS GRADE SERPL QL: ABNORMAL
LIVER STEATOSIS SCORE SERPL: 0.36 (ref 0–0.4)
NASH GRADE SERPL QL: ABNORMAL
NASH INTERPRETATION SERPL-IMP: ABNORMAL
NASH SCORE SERPL: 0 (ref 0–0.25)
NASH SCORING: ABNORMAL
STEATOSIS SCORING: ABNORMAL
TEST PERFORMANCE INFO SPEC: ABNORMAL
TEST PERFORMANCE INFO SPEC: ABNORMAL
TRIGL SERPL-MCNC: 92 MG/DL (ref 0–149)

## 2023-06-07 ENCOUNTER — OFFICE VISIT (OUTPATIENT)
Dept: FAMILY MEDICINE CLINIC | Facility: CLINIC | Age: 53
End: 2023-06-07
Payer: COMMERCIAL

## 2023-06-07 VITALS
HEIGHT: 61 IN | SYSTOLIC BLOOD PRESSURE: 126 MMHG | DIASTOLIC BLOOD PRESSURE: 80 MMHG | RESPIRATION RATE: 18 BRPM | OXYGEN SATURATION: 99 % | WEIGHT: 193 LBS | BODY MASS INDEX: 36.44 KG/M2 | HEART RATE: 92 BPM

## 2023-06-07 DIAGNOSIS — R73.03 PREDIABETES: ICD-10-CM

## 2023-06-07 DIAGNOSIS — R20.0 NUMBNESS OF FINGERS OF BOTH HANDS: ICD-10-CM

## 2023-06-07 DIAGNOSIS — R74.8 ELEVATED LIVER ENZYMES: ICD-10-CM

## 2023-06-07 DIAGNOSIS — I10 ESSENTIAL HYPERTENSION: ICD-10-CM

## 2023-06-07 DIAGNOSIS — E78.2 MIXED HYPERLIPIDEMIA: Primary | ICD-10-CM

## 2023-06-07 PROCEDURE — 99214 OFFICE O/P EST MOD 30 MIN: CPT | Performed by: FAMILY MEDICINE

## 2023-06-07 RX ORDER — SEMAGLUTIDE 0.25 MG/.5ML
0.25 INJECTION, SOLUTION SUBCUTANEOUS WEEKLY
Qty: 2 ML | Refills: 5 | Status: SHIPPED | OUTPATIENT
Start: 2023-06-07

## 2023-06-07 NOTE — PROGRESS NOTES
Subjective   Lauryn Barlow is a 53 y.o. female.   Patient with hypertension, chronic anxiety, hyperlipidemia, today for her follow-up on these issues.  Had recent labs.  Continues to struggle with her weight.  We were not able to get Wegovy approved will be tried once before but we talked about perhaps trying again to see if the formulary may have changed. She has tried multiple types of diets including low-fat low-carb and low-calorie but has not been able to achieve or maintain meaningful weight loss despite these efforts    She has had very mildly evaded liver enzymes that we have been following.  A Rivera FibroSure was done this time which was negative for steatosis or any liver issues.    Cholesterol was good, she continues to on rosuvastatin for this.    Blood pressure has been staying at goal.    She has symptoms of carpal tunnel.  Her hands are numb and tingling all the time and she has burning and stinging.  She has tried the carpal tunnel splints and the exercises with no relief of symptoms.    History of Present Illness  Hypertension   This is a chronic problem. The current episode started more than 1 year ago. The problem has been waxing and waning since onset. Associated symptoms include anxiety, headaches and malaise/fatigue. Pertinent negatives include no blurred vision, chest pain, neck pain, orthopnea, palpitations, peripheral edema, PND, shortness of breath or sweats. There are no associated agents to hypertension. Risk factors for coronary artery disease include dyslipidemia and family history. Past treatments include ACE inhibitor's The current treatment provides moderate improvement. There are no compliance problems.    Insomnia   This is a chronic problem. The current episode started more than 1 year ago. The problem occurs daily. The problem has been unchanged. Associated symptoms include fatigue. Pertinent negatives include no abdominal pain, anorexia, arthralgias, change in bowel habit,  "chest pain, chills, congestion, coughing, diaphoresis, fever, headaches, joint swelling, myalgias, nausea, neck pain, numbness, rash, sore throat, swollen glands, urinary symptoms, vertigo, visual change, vomiting or weakness. The symptoms are aggravated by exertion and stress. Treatments tried: OTC sleep aids. The treatment provided no relief.     The following portions of the patient's history were reviewed and updated as appropriate: allergies, current medications, past family history, past medical history, past social history, past surgical history and problem list.    Review of Systems   Constitutional: Negative.    HENT: Negative.     Respiratory: Negative.  Negative for shortness of breath.    Cardiovascular: Negative.  Negative for chest pain.   Gastrointestinal: Negative.    Musculoskeletal: Negative.  Negative for myalgias.   Skin: Negative.    Allergic/Immunologic: Negative for immunocompromised state.   Neurological:  Positive for numbness. Negative for dizziness, tremors, seizures, syncope and weakness.   Hematological: Negative.    Psychiatric/Behavioral:  Negative for agitation, confusion, dysphoric mood and sleep disturbance. The patient is not nervous/anxious.    All other systems reviewed and are negative.    Objective    Body mass index is 36.49 kg/m².  Vitals:    06/07/23 0800   BP: 126/80   Pulse: 92   Resp: 18   SpO2: 99%   Weight: 87.5 kg (193 lb)   Height: 154.9 cm (60.98\")       Physical Exam   Constitutional: She is oriented to person, place, and time. She appears well-developed. No distress.   HENT:   Head: Normocephalic and atraumatic.   Eyes: Pupils are equal, round, and reactive to light. Right eye exhibits no discharge. Left eye exhibits no discharge.   Pulmonary/Chest: Effort normal.   Neurological: She is alert and oriented to person, place, and time.   Tinel's test mildly positive bilateral wrist   Psychiatric: Her behavior is normal. Judgment and thought content normal.   Lab on " 05/31/2023   Component Date Value Ref Range Status    LDL Cholesterol  05/31/2023 85  0 - 100 mg/dL Final    Fibrosis Score 05/31/2023 0.09  0.00 - 0.21 Final    Fibrosis Stage 05/31/2023 Comment   Final                       F0 - No fibrosis    Steatosis Score (Reference) 05/31/2023 0.36  0.00 - 0.40 Final    Steatosis Grade (Reference) 05/31/2023 Comment   Final                       S0 - No Steatosis (<5%)    LAYTON Score (Reference) 05/31/2023 0.00  0.00 - 0.25 Final    Layton Grade (Reference) 05/31/2023 Comment   Final                       N0 - No LAYTON    Methodology: 05/31/2023 Comment   Final    The analytes tested are performed by FibroSure-Specific methods.  Not intended for use with other diagnostic considerations.    Alpha 2-Macroglobulins, Qn 05/31/2023 205  110 - 276 mg/dL Final    Haptoglobin 05/31/2023 108  33 - 346 mg/dL Final    Apolipoprotein A-1 05/31/2023 162  116 - 209 mg/dL Final    Total Bilirubin 05/31/2023 0.3  0.0 - 1.2 mg/dL Final    GGT 05/31/2023 11  0 - 60 IU/L Final    ALT (SGPT) 05/31/2023 37  0 - 40 IU/L Final    AST (SGOT) P5P (Reference) 05/31/2023 30  0 - 40 IU/L Final    Cholesterol, Total (Reference) 05/31/2023 159  100 - 199 mg/dL Final    Glucose, Serum (Reference) 05/31/2023 116 (H)  70 - 99 mg/dL Final    Triglycerides 05/31/2023 92  0 - 149 mg/dL Final    Interpretations: (Reference) 05/31/2023 Comment   Final    Comment: Quantitative results of 10 biochemicals in combination with age and  gender, are analyzed using a computational algorithm to provide a  quantitative surrogate marker (0.0-1.0) of liver fibrosis (Metavir  F0-F4), hepatic steatosis (0.0-1.0, S0-S3), and Non-Alcoholic  Steato-Hepatitis (LAYTON) (0.0-1.0, N0-N3). The absence of steatosis  (S<0.40) precludes the diagnosis of LAYTON.  Fibrosis marker: In a study of 171 Non-Alcoholic Fatty Liver Disease  (NAFLD) patients where 23% had significant NAFLD fibrosis (Metavir  F2-F4) and 11% had cirrhosis by liver biopsy, a  fibrosis result of  >0.3 yielded a sensitivity of 83% and a specificity of 78% for the  detection of significant fibrosis.[1]  Steatosis marker: In a population of 2997 patients, where 61% had  significant steatosis (>=5%) on a liver biopsy, a steatosis score  >0.4 had a sensitivity of 79% and a specificity of 50% for  identification of significant steatosis.[2]  LAYTON marker: In a population of 1081 NAFLD patients, where 51% had  at                            least some LAYTON by liver biopsy, a prediction of LAYTON had a  sensitivity of 72% for identifying LAYTON and a specificity of 71%.[3]    Fibrosis Scorin2023 Comment   Final         <=0.21 = Stage F0 - No fibrosis  0.21 - 0.27 = Stage F0 - F1  0.27 - 0.31 = Stage F1 - Portal fibrosis  0.31 - 0.48 = Stage F1 - F2  0.48 - 0.58 = Stage F2 - Bridging fibrosis with few septa  0.58 - 0.72 = Stage F3 - Bridging fibrosis with many septa  0.72 - 0.74 = Stage F3 - F4        >0.74 = Stage F4 - Cirrhosis    Steatosis Scoring 2023 Comment   Final         <=0.40 = S0  - No Steatosis (<5%)  0.40 - 0.55 = S1  - Mild Steatosis                      (but Clinically Significant) (5-33%)        >0.55 = S2S3- Moderate to Severe Steatosis                      (Clinically Significant) (%)    LAYTON Scoring 2023 Comment   Final         <=0.25 = N0 - No LAYTON  0.25 - 0.50 = N1 - Mild LAYTON  0.50 - 0.75 = N2 - Moderate LAYTON        >0.75 = N3 - Severe LAYTON    Limitations: 2023 Comment   Final    LAYTON FibroSure(R) Plus is recommended for patients with suspected  non-alcoholic fatty liver disease. It is not recommended for  patients with other liver diseases. It is also not recommended  in patients with Gilbert Disease, acute hemolysis, acute viral  hepatitis, drug induced hepatitis, genetic liver disease,  autoimmune hepatitis and/or extra-hepatic cholestasis. Any of  these clinical situations may lead to inaccurate quantitative  predictions of fibrosis.    Comment  05/31/2023 Comment   Final    This test was developed and its performance characteristics  determined by [facility]. It has not been cleared or approved  by the Food and Drug Administration.  For questions regarding this report please contact customer service  at [phone].  References:  1.  Roberto DALTON et al. Diagnostic Value of Biochemical Markers  (FibroTest) for the prediction of Liver Fibrosis in patients with  Non-Alcoholic Fatty Liver Disease. BMC Gastroenterology 2006; 6:6.  2.  Lionel YEAGER. et al. The Diagnostic Performance of a Simplified  Blood Test (SteatoTest-2) for the Prediction of Liver Steatosis.  Eur J Gastroenterol Hepatol. 2019; 31:393-402.  3.  Lionel YEAGER. et al. Diagnostic performance of a new noninvasive  test for nonalcoholic steatohepatitis using a simplified histological  reference. Eur J Gastroenterol Hepatol. 2018 May; 30:569-577.   ]    Assessment & Plan   Diagnoses and all orders for this visit:    1. Mixed hyperlipidemia (Primary)    2. Elevated liver enzymes    3. Essential hypertension    4. Prediabetes    5. Numbness of fingers of both hands  -     Ambulatory Referral to Physical Medicine Rehab    Other orders  -     Semaglutide-Weight Management (Wegovy) 0.25 MG/0.5ML solution auto-injector; Inject 0.25 mg under the skin into the appropriate area as directed 1 (One) Time Per Week.  Dispense: 2 mL; Refill: 5    Continue current medications for hypertension and continue to monitor blood pressures regularly with goal of 130/80 or less    Continue rosuvastatin for lipids.    We will refer to physical medicine for an EMG study for suspected carpal tunnel syndrome bilateral upper extremities and proceed with orthopedic referral from there as indicated.    At this point were just going to continue to follow the mildly elevated liver enzymes as they have not changed significantly.  [facility] FibroSure was negative.  Repeat labs in 6 months.    We will try Wegovy for weight loss.  This should help  with prediabetes and reducing insulin resistance as well. Patient's (Body mass index is 36.49 kg/m².) indicates that they are obese (BMI >30) with health related conditions that include hypertension and dyslipidemias . Weight is unchanged. BMI is is above average; BMI management plan is completed. We discussed portion control and increasing exercise.           This document has been electronically signed by Pratima Ling MD on June 7, 2023 08:15 CDT           Answers submitted by the patient for this visit:  Other (Submitted on 6/5/2023)  Please describe your symptoms.: blood work check up, you really need a n/a option  Have you had these symptoms before?: No  How long have you been having these symptoms?: 1-4 days  Please list any medications you are currently taking for this condition.: it's blood work  Please describe any probable cause for these symptoms. : the lab stuck me with a needle :D  Primary Reason for Visit (Submitted on 6/5/2023)  What is the primary reason for your visit?: Other

## 2023-09-25 ENCOUNTER — OFFICE VISIT (OUTPATIENT)
Dept: FAMILY MEDICINE CLINIC | Facility: CLINIC | Age: 53
End: 2023-09-25

## 2023-09-25 VITALS
HEART RATE: 81 BPM | TEMPERATURE: 98.2 F | SYSTOLIC BLOOD PRESSURE: 126 MMHG | WEIGHT: 207.6 LBS | DIASTOLIC BLOOD PRESSURE: 82 MMHG | OXYGEN SATURATION: 99 % | BODY MASS INDEX: 39.2 KG/M2 | HEIGHT: 61 IN

## 2023-09-25 DIAGNOSIS — G47.00 INSOMNIA, UNSPECIFIED TYPE: ICD-10-CM

## 2023-09-25 DIAGNOSIS — H69.92 DYSFUNCTION OF LEFT EUSTACHIAN TUBE: ICD-10-CM

## 2023-09-25 DIAGNOSIS — R42 VERTIGO: Primary | ICD-10-CM

## 2023-09-25 PROCEDURE — 99214 OFFICE O/P EST MOD 30 MIN: CPT | Performed by: FAMILY MEDICINE

## 2023-09-25 RX ORDER — MECLIZINE HYDROCHLORIDE 25 MG/1
25 TABLET ORAL 3 TIMES DAILY
Qty: 33 TABLET | Refills: 0 | COMMUNITY
Start: 2023-09-19 | End: 2023-09-30

## 2023-09-25 RX ORDER — PSEUDOEPHEDRINE HYDROCHLORIDE 60 MG/1
60 TABLET, FILM COATED ORAL EVERY 4 HOURS PRN
Qty: 30 TABLET | Refills: 1 | Status: SHIPPED | OUTPATIENT
Start: 2023-09-25

## 2023-09-25 RX ORDER — ONDANSETRON 4 MG/1
4 TABLET, ORALLY DISINTEGRATING ORAL 3 TIMES DAILY PRN
COMMUNITY
Start: 2023-09-19 | End: 2023-09-25

## 2023-09-25 NOTE — PROGRESS NOTES
"Subjective   Lauryn Barlow is a 53 y.o. female.   Patient with hypertension, chronic anxiety, hyperlipidemia, here today for follow-up after an episode of vertigo.  She had a severe episode of vertigo a few days ago.  She ended up going to the ER with it.  Work-up there included some labs.  She was given some Zofran and some meclizine and some IV Valium.  She has not had such a severe attack of the vertigo since that time but has had some milder episodes associated with movement of her head.  The initial episode was described as an intense spinning sensation and was associated with nausea and vomiting.  Since then she feels \"seasick\" a great deal of the time with nausea and has had some milder episodes of spinning vertigo.  She notes no hearing loss or tinnitus.  She does note that a few days before this episode started she had use of nasal saline spray for nasal stuffiness and congestion.    She does continue on Ambien for chronic insomnia and has done well with this.  Does not need refill today  Dizziness  This is a new problem. The current episode started in the past 7 days. The problem occurs intermittently. The problem has been waxing and waning. Associated symptoms include nausea, vertigo and vomiting. Pertinent negatives include no chest pain, myalgias, numbness or weakness. Exacerbated by: Change in position. Treatments tried: Zofran, meclizine. The treatment provided mild relief.     The following portions of the patient's history were reviewed and updated as appropriate: allergies, current medications, past family history, past medical history, past social history, past surgical history and problem list.    Review of Systems   Constitutional: Negative.    HENT: Negative.     Respiratory: Negative.  Negative for shortness of breath.    Cardiovascular: Negative.  Negative for chest pain.   Gastrointestinal:  Positive for nausea and vomiting.   Musculoskeletal: Negative.  Negative for myalgias.   Skin: " "Negative.    Allergic/Immunologic: Negative for immunocompromised state.   Neurological:  Positive for dizziness and vertigo. Negative for tremors, seizures, syncope, weakness and numbness.   Hematological: Negative.    Psychiatric/Behavioral:  Negative for agitation, confusion, dysphoric mood and sleep disturbance. The patient is not nervous/anxious.    All other systems reviewed and are negative.    Objective    Body mass index is 39.23 kg/m².  Vitals:    09/25/23 1031   BP: 126/82   BP Location: Left arm   Patient Position: Sitting   Cuff Size: Adult   Pulse: 81   Temp: 98.2 °F (36.8 °C)   TempSrc: Temporal   SpO2: 99%   Weight: 94.2 kg (207 lb 9.6 oz)   Height: 154.9 cm (61\")       Physical Exam   Constitutional: She is oriented to person, place, and time. She appears well-developed. No distress.   HENT:   Head: Normocephalic and atraumatic.   Right Ear: Tympanic membrane normal.   Nose: Nose normal.   Mouth/Throat: Mucous membranes are moist.   Eyes: Pupils are equal, round, and reactive to light. Right eye exhibits no discharge. Left eye exhibits no discharge.   Cardiovascular: Normal rate, regular rhythm and normal pulses.   Pulmonary/Chest: Effort normal and breath sounds normal.   Abdominal: Soft.   Musculoskeletal: Normal range of motion.   Neurological: She is alert and oriented to person, place, and time.       Skin: Skin is warm and dry.   Psychiatric: Her behavior is normal. Mood, judgment and thought content normal.       Assessment & Plan   Diagnoses and all orders for this visit:    1. Vertigo (Primary)    2. Dysfunction of left eustachian tube  -     pseudoephedrine (SUDAFED) 60 MG tablet; Take 1 tablet by mouth Every 4 (Four) Hours As Needed for Congestion.  Dispense: 30 tablet; Refill: 1    3. Insomnia, unspecified type    Reviewed ER notes and work-up.    Gave Sudafed for eustachian tube dysfunction symptoms as she does not tolerate steroids very well.  She is still having a lot of ear fullness " or spinning vertigo within a week she is to contact me for further work-up.  Also advised to use Flonase for eustachian tube dysfunction symptoms as well.    Continue Ambien for insomnia and let me know when she needs a refill.    Patient's (Body mass index is 39.23 kg/m².) indicates that they are obese (BMI >30) with health related conditions that include hypertension and dyslipidemias . Weight is unchanged. BMI is is above average; BMI management plan is completed. We discussed portion control and increasing exercise.     The patient has read and signed the [facility] Controlled Substance Contract.  I will continue to see patient for regular follow up appointments. Patient is well controlled on the medication.  DEVANTE has been reviewed by me and is updated every 3 months. The patient is aware of the potential for addiction and dependence.           This document has been electronically signed by Pratima Ling MD on September 25, 2023 10:49 CDT           Answers submitted by the patient for this visit:  Other (Submitted on 6/5/2023)  Please describe your symptoms.: blood work check up, you really need a n/a option  Have you had these symptoms before?: No  How long have you been having these symptoms?: 1-4 days  Please list any medications you are currently taking for this condition.: it's blood work  Please describe any probable cause for these symptoms. : the lab stuck me with a needle :D  Primary Reason for Visit (Submitted on 6/5/2023)  What is the primary reason for your visit?: Other